# Patient Record
Sex: FEMALE | Race: WHITE | NOT HISPANIC OR LATINO | Employment: FULL TIME | URBAN - METROPOLITAN AREA
[De-identification: names, ages, dates, MRNs, and addresses within clinical notes are randomized per-mention and may not be internally consistent; named-entity substitution may affect disease eponyms.]

---

## 2019-01-15 DIAGNOSIS — Z01.812 PRE-OPERATIVE LABORATORY EXAMINATION: ICD-10-CM

## 2019-01-15 PROCEDURE — 36415 COLL VENOUS BLD VENIPUNCTURE: CPT

## 2019-01-15 PROCEDURE — 84703 CHORIONIC GONADOTROPIN ASSAY: CPT

## 2019-01-15 RX ORDER — OMEGA-3 FATTY ACIDS/FISH OIL 300-1000MG
CAPSULE ORAL PRN
Status: ON HOLD | COMMUNITY
End: 2020-01-24

## 2019-01-15 RX ORDER — COVID-19 ANTIGEN TEST
KIT MISCELLANEOUS PRN
Status: ON HOLD | COMMUNITY
End: 2020-01-24

## 2019-01-16 LAB — HCG SERPL QL: NEGATIVE

## 2019-01-17 ENCOUNTER — HOSPITAL ENCOUNTER (OUTPATIENT)
Facility: MEDICAL CENTER | Age: 29
End: 2019-01-17
Attending: ORTHOPAEDIC SURGERY | Admitting: ORTHOPAEDIC SURGERY
Payer: OTHER MISCELLANEOUS

## 2019-01-17 VITALS
OXYGEN SATURATION: 94 % | SYSTOLIC BLOOD PRESSURE: 110 MMHG | TEMPERATURE: 97.2 F | BODY MASS INDEX: 24.39 KG/M2 | HEIGHT: 65 IN | DIASTOLIC BLOOD PRESSURE: 66 MMHG | WEIGHT: 146.39 LBS | HEART RATE: 90 BPM | RESPIRATION RATE: 16 BRPM

## 2019-01-17 PROCEDURE — 500028 HCHG ARTHROWAND TURBOVAC 3.5/90 SUCT.: Performed by: ORTHOPAEDIC SURGERY

## 2019-01-17 PROCEDURE — 500786 HCHG KNIFE, ACL GRAFT 10MM: Performed by: ORTHOPAEDIC SURGERY

## 2019-01-17 PROCEDURE — 500423 HCHG DRESSING, ABD COMBINE: Performed by: ORTHOPAEDIC SURGERY

## 2019-01-17 PROCEDURE — 160048 HCHG OR STATISTICAL LEVEL 1-5: Performed by: ORTHOPAEDIC SURGERY

## 2019-01-17 PROCEDURE — A9270 NON-COVERED ITEM OR SERVICE: HCPCS

## 2019-01-17 PROCEDURE — 500257: Performed by: ORTHOPAEDIC SURGERY

## 2019-01-17 PROCEDURE — 160025 RECOVERY II MINUTES (STATS): Performed by: ORTHOPAEDIC SURGERY

## 2019-01-17 PROCEDURE — 160029 HCHG SURGERY MINUTES - 1ST 30 MINS LEVEL 4: Performed by: ORTHOPAEDIC SURGERY

## 2019-01-17 PROCEDURE — 160036 HCHG PACU - EA ADDL 30 MINS PHASE I: Performed by: ORTHOPAEDIC SURGERY

## 2019-01-17 PROCEDURE — 160041 HCHG SURGERY MINUTES - EA ADDL 1 MIN LEVEL 4: Performed by: ORTHOPAEDIC SURGERY

## 2019-01-17 PROCEDURE — 501838 HCHG SUTURE GENERAL: Performed by: ORTHOPAEDIC SURGERY

## 2019-01-17 PROCEDURE — C1713 ANCHOR/SCREW BN/BN,TIS/BN: HCPCS | Performed by: ORTHOPAEDIC SURGERY

## 2019-01-17 PROCEDURE — 700111 HCHG RX REV CODE 636 W/ 250 OVERRIDE (IP)

## 2019-01-17 PROCEDURE — 700101 HCHG RX REV CODE 250

## 2019-01-17 PROCEDURE — A6222 GAUZE <=16 IN NO W/SAL W/O B: HCPCS | Performed by: ORTHOPAEDIC SURGERY

## 2019-01-17 PROCEDURE — 700102 HCHG RX REV CODE 250 W/ 637 OVERRIDE(OP)

## 2019-01-17 PROCEDURE — 502580 HCHG PACK, KNEE ARTHROSCOPY: Performed by: ORTHOPAEDIC SURGERY

## 2019-01-17 PROCEDURE — 160022 HCHG BLOCK: Performed by: ORTHOPAEDIC SURGERY

## 2019-01-17 PROCEDURE — 160047 HCHG PACU  - EA ADDL 30 MINS PHASE II: Performed by: ORTHOPAEDIC SURGERY

## 2019-01-17 PROCEDURE — 160035 HCHG PACU - 1ST 60 MINS PHASE I: Performed by: ORTHOPAEDIC SURGERY

## 2019-01-17 PROCEDURE — 160009 HCHG ANES TIME/MIN: Performed by: ORTHOPAEDIC SURGERY

## 2019-01-17 PROCEDURE — 160002 HCHG RECOVERY MINUTES (STAT): Performed by: ORTHOPAEDIC SURGERY

## 2019-01-17 PROCEDURE — 160046 HCHG PACU - 1ST 60 MINS PHASE II: Performed by: ORTHOPAEDIC SURGERY

## 2019-01-17 PROCEDURE — 700111 HCHG RX REV CODE 636 W/ 250 OVERRIDE (IP): Performed by: ANESTHESIOLOGY

## 2019-01-17 DEVICE — DEVICE ULTRABUTTON ADJUSTABLE FIXATION: Type: IMPLANTABLE DEVICE | Status: FUNCTIONAL

## 2019-01-17 DEVICE — SCREW BIOSURE 9X25MM: Type: IMPLANTABLE DEVICE | Status: FUNCTIONAL

## 2019-01-17 RX ORDER — CEFAZOLIN SODIUM 1 G/3ML
INJECTION, POWDER, FOR SOLUTION INTRAMUSCULAR; INTRAVENOUS
Status: DISCONTINUED | OUTPATIENT
Start: 2019-01-17 | End: 2019-01-17 | Stop reason: HOSPADM

## 2019-01-17 RX ORDER — ACETAMINOPHEN 500 MG
TABLET ORAL
Status: COMPLETED
Start: 2019-01-17 | End: 2019-01-17

## 2019-01-17 RX ORDER — ONDANSETRON 2 MG/ML
4 INJECTION INTRAMUSCULAR; INTRAVENOUS
Status: COMPLETED | OUTPATIENT
Start: 2019-01-17 | End: 2019-01-17

## 2019-01-17 RX ORDER — DIPHENHYDRAMINE HYDROCHLORIDE 50 MG/ML
12.5 INJECTION INTRAMUSCULAR; INTRAVENOUS
Status: DISCONTINUED | OUTPATIENT
Start: 2019-01-17 | End: 2019-01-17 | Stop reason: HOSPADM

## 2019-01-17 RX ORDER — MEPERIDINE HYDROCHLORIDE 25 MG/ML
6.25 INJECTION INTRAMUSCULAR; INTRAVENOUS; SUBCUTANEOUS
Status: DISCONTINUED | OUTPATIENT
Start: 2019-01-17 | End: 2019-01-17 | Stop reason: HOSPADM

## 2019-01-17 RX ORDER — CELECOXIB 200 MG/1
CAPSULE ORAL
Status: COMPLETED
Start: 2019-01-17 | End: 2019-01-17

## 2019-01-17 RX ORDER — SODIUM CHLORIDE, SODIUM LACTATE, POTASSIUM CHLORIDE, CALCIUM CHLORIDE 600; 310; 30; 20 MG/100ML; MG/100ML; MG/100ML; MG/100ML
INJECTION, SOLUTION INTRAVENOUS ONCE
Status: COMPLETED | OUTPATIENT
Start: 2019-01-17 | End: 2019-01-17

## 2019-01-17 RX ORDER — GABAPENTIN 300 MG/1
CAPSULE ORAL
Status: COMPLETED
Start: 2019-01-17 | End: 2019-01-17

## 2019-01-17 RX ORDER — BUPIVACAINE HYDROCHLORIDE 5 MG/ML
INJECTION, SOLUTION PERINEURAL
Status: DISCONTINUED | OUTPATIENT
Start: 2019-01-17 | End: 2019-01-17 | Stop reason: HOSPADM

## 2019-01-17 RX ORDER — GABAPENTIN 300 MG/1
300 CAPSULE ORAL ONCE
Status: COMPLETED | OUTPATIENT
Start: 2019-01-17 | End: 2019-01-17

## 2019-01-17 RX ORDER — ACETAMINOPHEN 500 MG
1000 TABLET ORAL ONCE
Status: COMPLETED | OUTPATIENT
Start: 2019-01-17 | End: 2019-01-17

## 2019-01-17 RX ORDER — HYDROMORPHONE HYDROCHLORIDE 2 MG/ML
0.1 INJECTION, SOLUTION INTRAMUSCULAR; INTRAVENOUS; SUBCUTANEOUS
Status: DISCONTINUED | OUTPATIENT
Start: 2019-01-17 | End: 2019-01-17 | Stop reason: HOSPADM

## 2019-01-17 RX ORDER — HYDROMORPHONE HYDROCHLORIDE 2 MG/ML
0.4 INJECTION, SOLUTION INTRAMUSCULAR; INTRAVENOUS; SUBCUTANEOUS
Status: DISCONTINUED | OUTPATIENT
Start: 2019-01-17 | End: 2019-01-17 | Stop reason: HOSPADM

## 2019-01-17 RX ORDER — CELECOXIB 200 MG/1
200 CAPSULE ORAL ONCE
Status: COMPLETED | OUTPATIENT
Start: 2019-01-17 | End: 2019-01-17

## 2019-01-17 RX ORDER — HALOPERIDOL 5 MG/ML
1 INJECTION INTRAMUSCULAR
Status: DISCONTINUED | OUTPATIENT
Start: 2019-01-17 | End: 2019-01-17 | Stop reason: HOSPADM

## 2019-01-17 RX ORDER — HYDROMORPHONE HYDROCHLORIDE 2 MG/ML
0.2 INJECTION, SOLUTION INTRAMUSCULAR; INTRAVENOUS; SUBCUTANEOUS
Status: DISCONTINUED | OUTPATIENT
Start: 2019-01-17 | End: 2019-01-17 | Stop reason: HOSPADM

## 2019-01-17 RX ADMIN — CELECOXIB 200 MG: 200 CAPSULE ORAL at 13:10

## 2019-01-17 RX ADMIN — SODIUM CHLORIDE, SODIUM LACTATE, POTASSIUM CHLORIDE, CALCIUM CHLORIDE: 600; 310; 30; 20 INJECTION, SOLUTION INTRAVENOUS at 13:06

## 2019-01-17 RX ADMIN — ACETAMINOPHEN 1000 MG: 500 TABLET, COATED ORAL at 13:10

## 2019-01-17 RX ADMIN — GABAPENTIN 300 MG: 300 CAPSULE ORAL at 13:10

## 2019-01-17 RX ADMIN — Medication 1000 MG: at 13:10

## 2019-01-17 RX ADMIN — ONDANSETRON 4 MG: 2 INJECTION INTRAMUSCULAR; INTRAVENOUS at 17:57

## 2019-01-17 ASSESSMENT — PAIN SCALES - GENERAL
PAINLEVEL_OUTOF10: 0

## 2019-01-17 NOTE — OR NURSING
1235: Brought patient back to pre-op and assumed care.  1315: Patient allergies and NPO status verified, home medication reconciliation completed and belongings secured. Patient verbalizes understanding of pain scale, expected course of stay and plan of care. Surgical site verified with patient. IV access established. Sequentials placed on legs.

## 2019-01-18 NOTE — DISCHARGE INSTRUCTIONS
ACTIVITY: Rest and take it easy for the first 24 hours.  A responsible adult is recommended to remain with you during that time.  It is normal to feel sleepy.  We encourage you to not do anything that requires balance, judgment or coordination.    MILD FLU-LIKE SYMPTOMS ARE NORMAL. YOU MAY EXPERIENCE GENERALIZED MUSCLE ACHES, THROAT IRRITATION, HEADACHE AND/OR SOME NAUSEA.    FOR 24 HOURS DO NOT:  Drive, operate machinery or run household appliances.  Drink beer or alcoholic beverages.   Make important decisions or sign legal documents.    SPECIAL INSTRUCTIONS: *TOE-TOUCH WEIGHT BEARING LEFT LEG AFTER BLOCK WEARS OFF . NO WEIGHT BEARING LEFT LEG UNTIL BLOCK WEARS OFF.*USE CRUTCHES.  PLEASE READ AND FOLLOW [facility] ORTHOPEDICS DISCHARGE INSTRUCTION SHEET.   DIET: To avoid nausea, slowly advance diet as tolerated, avoiding spicy or greasy foods for the first day.  Add more substantial food to your diet according to your physician's instructions.  Babies can be fed formula or breast milk as soon as they are hungry.  INCREASE FLUIDS AND FIBER TO AVOID CONSTIPATION.         *FOLLOW UP AS SCHEDULED WITH DR. BEARD.   You should CALL YOUR PHYSICIAN if you develop:  Fever greater than 101 degrees F.  Pain not relieved by medication, or persistent nausea or vomiting.  Excessive bleeding (blood soaking through dressing) or unexpected drainage from the wound.  Extreme redness or swelling around the incision site, drainage of pus or foul smelling drainage.  Inability to urinate or empty your bladder within 8 hours.  Problems with breathing or chest pain.    You should call 911 if you develop problems with breathing or chest pain.  If you are unable to contact your doctor or surgical center, you should go to the nearest emergency room or urgent care center.  Physician's telephone #: *[phone]**    If any questions arise, call your doctor.  If your doctor is not available, please feel free to call the Surgical Center at  [phone].  The Center is open Monday through Friday from 7AM to 7PM.  You can also call the HEALTH HOTLINE open 24 hours/day, 7 days/week and speak to a nurse at [phone], or toll free at [phone].    A registered nurse may call you a few days after your surgery to see how you are doing after your procedure.    MEDICATIONS: Resume taking daily medication.  Take prescribed pain medication with food.  If no medication is prescribed, you may take non-aspirin pain medication if needed.  PAIN MEDICATION CAN BE VERY CONSTIPATING.  Take a stool softener or laxative such as senokot, pericolace, or milk of magnesia if needed.    Prescription given for **PATIENT HAS*.  Last pain medication given at *none in PACU**. Zofran 4mg given at 5:57pm for nausea    If your physician has prescribed pain medication that includes Acetaminophen (Tylenol), do not take additional Acetaminophen (Tylenol) while taking the prescribed medication.    Depression / Suicide Risk    As you are discharged from this [facility] facility, it is important to learn how to keep safe from harming yourself.    Recognize the warning signs:  · Abrupt changes in personality, positive or negative- including increase in energy   · Giving away possessions  · Change in eating patterns- significant weight changes-  positive or negative  · Change in sleeping patterns- unable to sleep or sleeping all the time   · Unwillingness or inability to communicate  · Depression  · Unusual sadness, discouragement and loneliness  · Talk of wanting to die  · Neglect of personal appearance   · Rebelliousness- reckless behavior  · Withdrawal from people/activities they love  · Confusion- inability to concentrate     If you or a loved one observes any of these behaviors or has concerns about self-harm, here's what you can do:  · Talk about it- your feelings and reasons for harming yourself  · Remove any means that you might use to hurt yourself (examples: pills,  rope, extension cords, firearm)  · Get professional help from the community (Mental Health, Substance Abuse, psychological counseling)  · Do not be alone:Call your Safe Contact- someone whom you trust who will be there for you.  · Call your local CRISIS HOTLINE 868-2089 or 873-082-5501  · Call your local Children's Mobile Crisis Response Team [place] (540) 766-0260 or www.Sellfy  · Call the toll free National Suicide Prevention Hotlines   · National Suicide Prevention Lifeline 125-421-EVED (0485)  [place] [facility] 800-SUICIDE (636-0784)    Peripheral Nerve Block Discharge Instructions from Same Day Surgery and Inpatient :    What to Expect - Lower Extremity  · The block may cause you to experience numbness and weakness in your {LEG LOCATION [id number]} on the same side as your surgery  · Numbness, tingling and / or weakness are all normal. For some people, this may be an unpleasant sensation  · These issues will be resolved when the local anesthetic wears off   · You may experience numbness and tingling in your thigh on the same side as your surgery if the block medicine was injected at your groin area  · Numbness will make it difficult to walk  · You may have problems with balance and walking so be very careful   · Follow your surgeon's direction regarding weight bearing on your surgical limb  · Be very careful with your numb limb  Precautions  · The numbness may affect your balance  · Be careful when walking or moving around  · Your leg may be weak: be very careful putting weight on it  · If your surgeon did not specify a time, you should not bear weight for 24 hours  · Be sure to ask for help when you need it  · It is better to have help than to fall and hurt yourself  Prevent Injury  · Protect the limb like a baby  · Beware of exposing your limb to extreme heat or cold or trauma  · The limb may be injured without you noticing because it is numb  · Keep the limb elevated whenever  "possible  · Do not sleep on the limb  · Change the position of the limb regularly  · Avoid putting pressure on your surgical limb  Pain Control  · The initial block on the day of surgery will make your extremity feel \"numb\"  · Any consecutive injection including prior to discharge from the hospital will make your extremity feel \"numb\"  · You may feel an aching or burning when the local anesthesia starts to wear off  · Take pain pills as prescribed by your surgeon  · Call your surgeon or anesthesiologist if you do not have adequate pain control  ·   "

## 2019-01-18 NOTE — OR NURSING
Became nauseated when getting ready to go to PACU 2.  Zofran given. VS remain stable, no emesis, report to PACU 2.  No change in assessment.

## 2019-01-18 NOTE — OR NURSING
Respirations easy in PACU. Dressing clean and dry.  Brace in place.  Left leg elevated on pillows with ice to left knee. Palpable pedal pulses, feet warm and pink with brisk capillary refill, toes mobile. Patient denies pain and nausea.

## 2019-01-18 NOTE — OR NURSING
"1815 patient to stage 2  Patient settled in recliner chair post short ambulation from erika - pt dressed with assist by RN. Dressing CDI to L knee with polar ice pad under ace wrap and brace to LLE in place. Pt denies pain but reports intermittent nausea. Reports hx of frequent nausea as baseline.   1901 Reviewed discharge instructions with patient/family; family states verbal understanding. Pt reports feeling sleepy and nausea improved and agrees to go home when IV LR bag complete. Pt reports crutches at home and previous experience with ambulation.  1925 Pt reports \"ready to go home\". \"Can I have chik-keya-a?\" D/Josue to care of family post uneventful stay in PACU 2.    "

## 2019-01-18 NOTE — OR SURGEON
Immediate Post OP Note    PreOp Diagnosis: left knee ACL tear    PostOp Diagnosis: left knee acl tear and lateral meniscus tear    Procedure(s):  ACL RECONSTRUCTION SCOPE - W/BONE TENDON BONE AUTOGRAFT - Wound Class: Clean  MENISCECTOMY - PARTIAL LATERAL - Wound Class: Clean    Surgeon(s):  Antwon Garrido M.D.    Anesthesiologist/Type of Anesthesia:  Anesthesiologist: Kaz Stewart M.D./General    Surgical Staff:  Assistant: Iram Stanley CFA  Circulator: Duke Bowles R.N.  Scrub Person: Annie Barrett    Specimens removed if any:  * No specimens in log *    Estimated Blood Loss: minimal     Findings: acl tear , lateral meniscus tear     Complications: no apparent         1/17/2019 4:32 PM Antwon Garrido M.D.

## 2019-01-18 NOTE — OP REPORT
DATE OF SERVICE:  01/17/2019    PREOPERATIVE DIAGNOSIS:  Left knee anterior cruciate ligament tear.    POSTOPERATIVE DIAGNOSES:  Left knee anterior cruciate ligament tear and   lateral meniscus tear.    PROCEDURES:  1.  Left knee arthroscopy with ACL reconstruction using BTB autograft.  2.  Left knee partial lateral meniscectomy.    SURGEON:  Antwon Garrido MD    ASSISTANT:  Iram Stanley, certified first assist.    ANESTHESIA:  General plus local and block.    ANESTHESIOLOGIST:  Kaz Stewart MD    TOURNIQUET USE:  None.    BLOOD LOSS:  Minimal.    COMPLICATIONS:  No apparent.    DISPOSITION:  PACU.    CONDITION:  Stable.    INDICATIONS:  The patient is a 28-year-old active female who injured her left   knee skiing.  She had an ACL tear confirmed on MRI.  Exam was consistent with   this.  Discussed with her the treatment options of ACL with conservative or   reconstruction.  She elected to proceed with reconstruction using BTB   autograft.  We discussed risks, benefits, and rationale, risk including but   not limited to infection, neurovascular injury, incomplete relief of symptoms,   inability to return to pre-injury state, DVT, PE, cardiac arrest, possible   need for further surgery, and complications of anesthesia.  Informed consent   was signed and placed on the chart.  All of her questions are answered.  No   guarantees implied or given.    TECHNIQUE:  Patient and I both agreed the correct operative extremity in   preoperative holding.  Left leg was signed and marked in preoperative holding.    She was given 1 gram IV Ancef prophylaxis.  I consult Dr. Kaz Stewart of   anesthesias regarding perioperative pain management.  He consented the patient   for an adductor canal block, which was carried out under sterile technique   without complication.  Patient was taken to the operative suite.  After   adequate anesthesia, a time-out was taken by all in the room.  Examination   under anesthesia of the  right uninvolved leg showed a stable Lachman's,   negative pivot shift, and stable ligaments.  Examination of the left leg   showed full range of motion, stable to varus and valgus stress at 0 and 30   degrees, stable posterior drawer with a positive Lachman's, and positive pivot   shift.  Tourniquet was placed, but not inflated.  The left leg was sterilely   prepped and draped in standard fashion.    Standard arthroscopic lateral portal was established with an inflow outflow   superomedial portal.  Findings were that of a lateral meniscus tear, radial   component at the mid body with an unstable flap and ACL tear.  Scope was   removed.  A midline incision was made.  A central third patellar   bone-tendon-bone graft was harvested.    Iram Stanley, certified first assist, prepped the graft at the back table   to a size 10.  I then completed a diagnostic arthroscopy finding the cartilage   was intact throughout the rest of the knee.  Arthroscopic biters were used   for a partial lateral meniscectomy to take the radial tear back to a smooth   contoured edge.  The rest of the lateral meniscus was stable.  Remnant of the   ACL was debrided, noting the anatomic insertion on the tibia and the femur.    Guide pin was placed in the anatomic footprint of the tibia and this was   overdrilled with a 6 and then 10 mm cannulated reamer.  Bone graft was   harvested for later use.  Using a 7 mm Smith and Nephew over-the-top guide,   the knee was hyperflexed and this was used to place a guide pin in the   footprint of the femoral attachment of the ACL.  This was then drilled over   with an Endobutton 4.5 mm drill through the far cortex, followed by a 10 mm   acorn reamer to a depth of 24 mm for a roughly 21 or 22 mm bone plug.  Bone   was harvested and saved.  The bone graft was saved for later use.  A suture   was placed on the guide pin.  This was pulled through the tunnels and graft   was passed without difficulty.  Endobutton  was flipped on the far cortex.    Ultra button was used and excess tension was taken out of the graft.  This   resulted in good tunnel and a graft tunnel match with very small excess tibial   bone hanging out of the tibial tunnel.  The knee was cycled.  Graft was felt   to be isometric.  It was then tensioned and a 9x25 BioComposite screw was   placed in the tibial tunnel.  Reevaluation showed the graft in good position.    Full extension was achievable.  Good tension on the graft.  Stable Lachman   testing.  Reevaluation showed no loose debris in the knee.  The central third   of the patellar tendon was closed with interrupted figure-of-eight #3 Vicryl.    Bone graft was placed in the patellar defect.  This was closed over with 2-0   Vicryl.  Remaining bone graft was placed into the tibial defect, which was   closed over with 2-0 Vicryl.  Subcutaneous tissues closed with 2-0 Vicryl   after irrigation, followed by nylon for the skin and portals.  Xeroform was   placed over the incisions and 0.5% Marcaine plain was injected into the   incision and the joint.  Soft sterile dressing was applied.  Patient   transferred to recovery in stable condition.  Counts correct.  No apparent   complications.    Iram Stanley, certified first assist, was essential for successful   completion of the case.  It could not have been done without her.  In   recovery, patient was able to dorsiflex, plantar flex, intact to light sensory   touch.  Toes are pink, warm with brisk cap refill.       ____________________________________     MD BEBO Faulkner / DAVID    DD:  01/17/2019 16:40:04  DT:  01/17/2019 17:22:48    D#:  [id number]  Job#:  [id number]

## 2019-06-26 LAB
ABO GROUP BLD: NORMAL
BLD GP AB SCN SERPL QL: NEGATIVE
GLUCOSE 1H P CHAL SERPL-MCNC: 99 MG/DL
HBV SURFACE AG SERPL QL IA: NORMAL
RH BLD: POSITIVE
RUBV IGG SERPL IA-ACNC: NORMAL

## 2019-08-02 ENCOUNTER — HOSPITAL ENCOUNTER (OUTPATIENT)
Dept: LAB | Facility: MEDICAL CENTER | Age: 29
End: 2019-08-02
Attending: OBSTETRICS & GYNECOLOGY
Payer: COMMERCIAL

## 2019-08-02 PROCEDURE — 81420 FETAL CHRMOML ANEUPLOIDY: CPT

## 2019-08-02 PROCEDURE — 81422 FETAL CHRMOML MICRODELTJ: CPT

## 2019-08-14 LAB
22Q112 DEL SYND Q0669: NORMAL
5P15.2 DEL BLD/T FISH: NORMAL
ANNOTATION COMMENT IMP: NORMAL
AS GENE MUT ANL BLD/T: NORMAL
DEL 1P36 SYND Q0208: NORMAL
EER FATAL ANEU W MICROD Q0212: NORMAL
FET CHR X + Y ANEUP RISK PLAS.CFDNA QN: NORMAL
FET SEX US: NORMAL
FET TS 13 RISK PLAS.CFDNA QL: NORMAL
FET TS 18 RISK PLAS.CFDNA QL: NORMAL
FET TS 21 RISK PLAS.CFDNA QL: NORMAL
FETAL FRACTION Q0204: 7.8
GA (DAYS): 3 D
GA (WEEKS): 15 WK
PWS GENE MUT ANL BLD/T: NORMAL
SERVICE CMNT-IMP: YES
TRIPLOIDY VAN TWIN Q0202: NORMAL

## 2019-10-28 ENCOUNTER — HOSPITAL ENCOUNTER (OUTPATIENT)
Dept: LAB | Facility: MEDICAL CENTER | Age: 29
End: 2019-10-28
Attending: OBSTETRICS & GYNECOLOGY
Payer: COMMERCIAL

## 2019-10-28 LAB
ERYTHROCYTE [DISTWIDTH] IN BLOOD BY AUTOMATED COUNT: 47.2 FL (ref 35.9–50)
GLUCOSE 1H P 50 G GLC PO SERPL-MCNC: 99 MG/DL (ref 70–139)
HCT VFR BLD AUTO: 43.4 % (ref 37–47)
HGB BLD-MCNC: 13.7 G/DL (ref 12–16)
MCH RBC QN AUTO: 30.4 PG (ref 27–33)
MCHC RBC AUTO-ENTMCNC: 31.6 G/DL (ref 33.6–35)
MCV RBC AUTO: 96.4 FL (ref 81.4–97.8)
PLATELET # BLD AUTO: 127 K/UL (ref 164–446)
PMV BLD AUTO: 11.3 FL (ref 9–12.9)
RBC # BLD AUTO: 4.5 M/UL (ref 4.2–5.4)
TREPONEMA PALLIDUM IGG+IGM AB [PRESENCE] IN SERUM OR PLASMA BY IMMUNOASSAY: NON REACTIVE
WBC # BLD AUTO: 9.7 K/UL (ref 4.8–10.8)

## 2019-10-28 PROCEDURE — 86780 TREPONEMA PALLIDUM: CPT

## 2019-10-28 PROCEDURE — 85027 COMPLETE CBC AUTOMATED: CPT

## 2019-10-28 PROCEDURE — 36415 COLL VENOUS BLD VENIPUNCTURE: CPT

## 2019-10-28 PROCEDURE — 82950 GLUCOSE TEST: CPT

## 2019-11-22 ENCOUNTER — HOSPITAL ENCOUNTER (OUTPATIENT)
Dept: LAB | Facility: MEDICAL CENTER | Age: 29
End: 2019-11-22
Attending: OBSTETRICS & GYNECOLOGY
Payer: COMMERCIAL

## 2019-11-22 LAB
ERYTHROCYTE [DISTWIDTH] IN BLOOD BY AUTOMATED COUNT: 45.7 FL (ref 35.9–50)
HCT VFR BLD AUTO: 40.2 % (ref 37–47)
HGB BLD-MCNC: 12.8 G/DL (ref 12–16)
MCH RBC QN AUTO: 29.9 PG (ref 27–33)
MCHC RBC AUTO-ENTMCNC: 31.8 G/DL (ref 33.6–35)
MCV RBC AUTO: 93.9 FL (ref 81.4–97.8)
PLATELET # BLD AUTO: 176 K/UL (ref 164–446)
PMV BLD AUTO: 10.9 FL (ref 9–12.9)
RBC # BLD AUTO: 4.28 M/UL (ref 4.2–5.4)
WBC # BLD AUTO: 12.1 K/UL (ref 4.8–10.8)

## 2019-11-22 PROCEDURE — 36415 COLL VENOUS BLD VENIPUNCTURE: CPT

## 2019-11-22 PROCEDURE — 85027 COMPLETE CBC AUTOMATED: CPT

## 2019-12-30 ENCOUNTER — HOSPITAL ENCOUNTER (OUTPATIENT)
Dept: LAB | Facility: MEDICAL CENTER | Age: 29
End: 2019-12-30
Attending: OBSTETRICS & GYNECOLOGY
Payer: COMMERCIAL

## 2019-12-30 PROCEDURE — 87081 CULTURE SCREEN ONLY: CPT

## 2019-12-30 PROCEDURE — 87150 DNA/RNA AMPLIFIED PROBE: CPT

## 2019-12-31 LAB
AMBIGUOUS DTTM AMBI4: NORMAL
SIGNIFICANT IND 70042: NORMAL
SITE SITE: NORMAL
SOURCE SOURCE: NORMAL

## 2020-01-01 LAB — GP B STREP DNA SPEC QL NAA+PROBE: POSITIVE

## 2020-01-22 ENCOUNTER — HOSPITAL ENCOUNTER (OUTPATIENT)
Dept: LAB | Facility: MEDICAL CENTER | Age: 30
End: 2020-01-22
Attending: OBSTETRICS & GYNECOLOGY
Payer: COMMERCIAL

## 2020-01-22 LAB
ERYTHROCYTE [DISTWIDTH] IN BLOOD BY AUTOMATED COUNT: 45.9 FL (ref 35.9–50)
HCT VFR BLD AUTO: 44.3 % (ref 37–47)
HGB BLD-MCNC: 14.1 G/DL (ref 12–16)
MCH RBC QN AUTO: 29.2 PG (ref 27–33)
MCHC RBC AUTO-ENTMCNC: 31.8 G/DL (ref 33.6–35)
MCV RBC AUTO: 91.7 FL (ref 81.4–97.8)
PLATELET # BLD AUTO: 228 K/UL (ref 164–446)
PMV BLD AUTO: 11 FL (ref 9–12.9)
RBC # BLD AUTO: 4.83 M/UL (ref 4.2–5.4)
WBC # BLD AUTO: 12 K/UL (ref 4.8–10.8)

## 2020-01-22 PROCEDURE — 85027 COMPLETE CBC AUTOMATED: CPT

## 2020-01-24 ENCOUNTER — ANESTHESIA EVENT (OUTPATIENT)
Dept: OBGYN | Facility: MEDICAL CENTER | Age: 30
End: 2020-01-24
Payer: COMMERCIAL

## 2020-01-24 ENCOUNTER — ANESTHESIA (OUTPATIENT)
Dept: OBGYN | Facility: MEDICAL CENTER | Age: 30
End: 2020-01-24
Payer: COMMERCIAL

## 2020-01-24 ENCOUNTER — HOSPITAL ENCOUNTER (INPATIENT)
Facility: MEDICAL CENTER | Age: 30
LOS: 3 days | End: 2020-01-27
Attending: OBSTETRICS & GYNECOLOGY | Admitting: OBSTETRICS & GYNECOLOGY
Payer: COMMERCIAL

## 2020-01-24 DIAGNOSIS — G89.18 POSTOPERATIVE PAIN: ICD-10-CM

## 2020-01-24 LAB
BASOPHILS # BLD AUTO: 0.5 % (ref 0–1.8)
BASOPHILS # BLD: 0.08 K/UL (ref 0–0.12)
CRYSTALS AMN MICRO: NORMAL
EOSINOPHIL # BLD AUTO: 0.24 K/UL (ref 0–0.51)
EOSINOPHIL NFR BLD: 1.5 % (ref 0–6.9)
ERYTHROCYTE [DISTWIDTH] IN BLOOD BY AUTOMATED COUNT: 43.8 FL (ref 35.9–50)
HCT VFR BLD AUTO: 41.3 % (ref 37–47)
HGB BLD-MCNC: 14.2 G/DL (ref 12–16)
HOLDING TUBE BB 8507: NORMAL
IMM GRANULOCYTES # BLD AUTO: 0.19 K/UL (ref 0–0.11)
IMM GRANULOCYTES NFR BLD AUTO: 1.2 % (ref 0–0.9)
LYMPHOCYTES # BLD AUTO: 4.35 K/UL (ref 1–4.8)
LYMPHOCYTES NFR BLD: 26.8 % (ref 22–41)
MCH RBC QN AUTO: 30.3 PG (ref 27–33)
MCHC RBC AUTO-ENTMCNC: 34.4 G/DL (ref 33.6–35)
MCV RBC AUTO: 88.2 FL (ref 81.4–97.8)
MONOCYTES # BLD AUTO: 1.51 K/UL (ref 0–0.85)
MONOCYTES NFR BLD AUTO: 9.3 % (ref 0–13.4)
NEUTROPHILS # BLD AUTO: 9.88 K/UL (ref 2–7.15)
NEUTROPHILS NFR BLD: 60.7 % (ref 44–72)
NRBC # BLD AUTO: 0 K/UL
NRBC BLD-RTO: 0 /100 WBC
PLATELET # BLD AUTO: 235 K/UL (ref 164–446)
PMV BLD AUTO: 10.9 FL (ref 9–12.9)
RBC # BLD AUTO: 4.68 M/UL (ref 4.2–5.4)
WBC # BLD AUTO: 16.3 K/UL (ref 4.8–10.8)

## 2020-01-24 PROCEDURE — 700105 HCHG RX REV CODE 258: Performed by: ANESTHESIOLOGY

## 2020-01-24 PROCEDURE — 770002 HCHG ROOM/CARE - OB PRIVATE (112)

## 2020-01-24 PROCEDURE — 304964 HCHG RECOVERY ROOM TIME 1HR: Performed by: OBSTETRICS & GYNECOLOGY

## 2020-01-24 PROCEDURE — 89060 EXAM SYNOVIAL FLUID CRYSTALS: CPT

## 2020-01-24 PROCEDURE — 700102 HCHG RX REV CODE 250 W/ 637 OVERRIDE(OP): Performed by: ANESTHESIOLOGY

## 2020-01-24 PROCEDURE — A9270 NON-COVERED ITEM OR SERVICE: HCPCS | Performed by: OBSTETRICS & GYNECOLOGY

## 2020-01-24 PROCEDURE — 700111 HCHG RX REV CODE 636 W/ 250 OVERRIDE (IP): Performed by: ANESTHESIOLOGY

## 2020-01-24 PROCEDURE — A9270 NON-COVERED ITEM OR SERVICE: HCPCS | Performed by: ANESTHESIOLOGY

## 2020-01-24 PROCEDURE — 700101 HCHG RX REV CODE 250: Performed by: ANESTHESIOLOGY

## 2020-01-24 PROCEDURE — 700111 HCHG RX REV CODE 636 W/ 250 OVERRIDE (IP): Performed by: OBSTETRICS & GYNECOLOGY

## 2020-01-24 PROCEDURE — 36415 COLL VENOUS BLD VENIPUNCTURE: CPT

## 2020-01-24 PROCEDURE — 10H07YZ INSERTION OF OTHER DEVICE INTO PRODUCTS OF CONCEPTION, VIA NATURAL OR ARTIFICIAL OPENING: ICD-10-PCS | Performed by: OBSTETRICS & GYNECOLOGY

## 2020-01-24 PROCEDURE — 700102 HCHG RX REV CODE 250 W/ 637 OVERRIDE(OP): Performed by: OBSTETRICS & GYNECOLOGY

## 2020-01-24 PROCEDURE — 305385 HCHG SURGICAL SERVICES 1/4 HOUR: Performed by: OBSTETRICS & GYNECOLOGY

## 2020-01-24 PROCEDURE — 59514 CESAREAN DELIVERY ONLY: CPT

## 2020-01-24 PROCEDURE — 306828 HCHG ANES-TIME GENERAL: Performed by: OBSTETRICS & GYNECOLOGY

## 2020-01-24 PROCEDURE — 700105 HCHG RX REV CODE 258

## 2020-01-24 PROCEDURE — 700111 HCHG RX REV CODE 636 W/ 250 OVERRIDE (IP)

## 2020-01-24 PROCEDURE — 700105 HCHG RX REV CODE 258: Performed by: OBSTETRICS & GYNECOLOGY

## 2020-01-24 PROCEDURE — 303615 HCHG EPIDURAL/SPINAL ANESTHESIA FOR LABOR

## 2020-01-24 PROCEDURE — 85025 COMPLETE CBC W/AUTO DIFF WBC: CPT

## 2020-01-24 RX ORDER — IBUPROFEN 600 MG/1
600 TABLET ORAL EVERY 6 HOURS PRN
Status: DISCONTINUED | OUTPATIENT
Start: 2020-01-24 | End: 2020-01-26

## 2020-01-24 RX ORDER — BUPIVACAINE HYDROCHLORIDE 2.5 MG/ML
INJECTION, SOLUTION EPIDURAL; INFILTRATION; INTRACAUDAL
Status: ACTIVE
Start: 2020-01-24 | End: 2020-01-24

## 2020-01-24 RX ORDER — SODIUM CHLORIDE, SODIUM LACTATE, POTASSIUM CHLORIDE, CALCIUM CHLORIDE 600; 310; 30; 20 MG/100ML; MG/100ML; MG/100ML; MG/100ML
INJECTION, SOLUTION INTRAVENOUS
Status: ACTIVE
Start: 2020-01-24 | End: 2020-01-24

## 2020-01-24 RX ORDER — HYDROMORPHONE HYDROCHLORIDE 1 MG/ML
0.1 INJECTION, SOLUTION INTRAMUSCULAR; INTRAVENOUS; SUBCUTANEOUS
Status: DISCONTINUED | OUTPATIENT
Start: 2020-01-24 | End: 2020-01-24 | Stop reason: HOSPADM

## 2020-01-24 RX ORDER — KETOROLAC TROMETHAMINE 30 MG/ML
30 INJECTION, SOLUTION INTRAMUSCULAR; INTRAVENOUS EVERY 6 HOURS
Status: DISPENSED | OUTPATIENT
Start: 2020-01-25 | End: 2020-01-25

## 2020-01-24 RX ORDER — HYDROXYZINE 50 MG/1
50 TABLET, FILM COATED ORAL ONCE
Status: COMPLETED | OUTPATIENT
Start: 2020-01-24 | End: 2020-01-24

## 2020-01-24 RX ORDER — SODIUM CHLORIDE, SODIUM GLUCONATE, SODIUM ACETATE, POTASSIUM CHLORIDE AND MAGNESIUM CHLORIDE 526; 502; 368; 37; 30 MG/100ML; MG/100ML; MG/100ML; MG/100ML; MG/100ML
INJECTION, SOLUTION INTRAVENOUS
Status: DISCONTINUED | OUTPATIENT
Start: 2020-01-24 | End: 2020-01-24 | Stop reason: SURG

## 2020-01-24 RX ORDER — OXYCODONE HCL 5 MG/5 ML
10 SOLUTION, ORAL ORAL
Status: DISCONTINUED | OUTPATIENT
Start: 2020-01-24 | End: 2020-01-24 | Stop reason: HOSPADM

## 2020-01-24 RX ORDER — ONDANSETRON 2 MG/ML
4 INJECTION INTRAMUSCULAR; INTRAVENOUS EVERY 6 HOURS PRN
Status: DISCONTINUED | OUTPATIENT
Start: 2020-01-24 | End: 2020-01-25

## 2020-01-24 RX ORDER — METHYLERGONOVINE MALEATE 0.2 MG/ML
INJECTION INTRAVENOUS PRN
Status: DISCONTINUED | OUTPATIENT
Start: 2020-01-24 | End: 2020-01-24 | Stop reason: SURG

## 2020-01-24 RX ORDER — ROPIVACAINE HYDROCHLORIDE 2 MG/ML
INJECTION, SOLUTION EPIDURAL; INFILTRATION; PERINEURAL
Status: COMPLETED
Start: 2020-01-24 | End: 2020-01-24

## 2020-01-24 RX ORDER — IBUPROFEN 800 MG/1
800 TABLET ORAL EVERY 8 HOURS PRN
Status: DISCONTINUED | OUTPATIENT
Start: 2020-01-24 | End: 2020-01-24

## 2020-01-24 RX ORDER — DIPHENHYDRAMINE HYDROCHLORIDE 50 MG/ML
12.5 INJECTION INTRAMUSCULAR; INTRAVENOUS
Status: DISCONTINUED | OUTPATIENT
Start: 2020-01-24 | End: 2020-01-24 | Stop reason: HOSPADM

## 2020-01-24 RX ORDER — MORPHINE SULFATE 0.5 MG/ML
INJECTION, SOLUTION EPIDURAL; INTRATHECAL; INTRAVENOUS PRN
Status: DISCONTINUED | OUTPATIENT
Start: 2020-01-24 | End: 2020-01-24 | Stop reason: SURG

## 2020-01-24 RX ORDER — ONDANSETRON 2 MG/ML
INJECTION INTRAMUSCULAR; INTRAVENOUS PRN
Status: DISCONTINUED | OUTPATIENT
Start: 2020-01-24 | End: 2020-01-24 | Stop reason: SURG

## 2020-01-24 RX ORDER — OXYCODONE HYDROCHLORIDE 10 MG/1
10 TABLET ORAL EVERY 4 HOURS PRN
Status: DISCONTINUED | OUTPATIENT
Start: 2020-01-24 | End: 2020-01-27 | Stop reason: HOSPADM

## 2020-01-24 RX ORDER — ONDANSETRON 2 MG/ML
4 INJECTION INTRAMUSCULAR; INTRAVENOUS
Status: DISCONTINUED | OUTPATIENT
Start: 2020-01-24 | End: 2020-01-24 | Stop reason: HOSPADM

## 2020-01-24 RX ORDER — OXYCODONE HYDROCHLORIDE AND ACETAMINOPHEN 5; 325 MG/1; MG/1
1 TABLET ORAL EVERY 4 HOURS PRN
Status: DISCONTINUED | OUTPATIENT
Start: 2020-01-24 | End: 2020-01-24

## 2020-01-24 RX ORDER — HALOPERIDOL 5 MG/ML
1 INJECTION INTRAMUSCULAR
Status: DISCONTINUED | OUTPATIENT
Start: 2020-01-24 | End: 2020-01-24 | Stop reason: HOSPADM

## 2020-01-24 RX ORDER — ONDANSETRON 2 MG/ML
4 INJECTION INTRAMUSCULAR; INTRAVENOUS EVERY 6 HOURS PRN
Status: DISCONTINUED | OUTPATIENT
Start: 2020-01-24 | End: 2020-01-27 | Stop reason: HOSPADM

## 2020-01-24 RX ORDER — DOCUSATE SODIUM 100 MG/1
100 CAPSULE, LIQUID FILLED ORAL 2 TIMES DAILY PRN
Status: DISCONTINUED | OUTPATIENT
Start: 2020-01-24 | End: 2020-01-27 | Stop reason: HOSPADM

## 2020-01-24 RX ORDER — OXYCODONE HYDROCHLORIDE 5 MG/1
5 TABLET ORAL EVERY 4 HOURS PRN
Status: DISCONTINUED | OUTPATIENT
Start: 2020-01-24 | End: 2020-01-27 | Stop reason: HOSPADM

## 2020-01-24 RX ORDER — DIPHENHYDRAMINE HCL 25 MG
25 TABLET ORAL EVERY 6 HOURS PRN
Status: DISCONTINUED | OUTPATIENT
Start: 2020-01-24 | End: 2020-01-27 | Stop reason: HOSPADM

## 2020-01-24 RX ORDER — PENICILLIN G POTASSIUM 5000000 [IU]/1
INJECTION, POWDER, FOR SOLUTION INTRAMUSCULAR; INTRAVENOUS
Status: ACTIVE
Start: 2020-01-24 | End: 2020-01-24

## 2020-01-24 RX ORDER — METHYLERGONOVINE MALEATE 0.2 MG/ML
0.2 INJECTION INTRAVENOUS
Status: DISCONTINUED | OUTPATIENT
Start: 2020-01-24 | End: 2020-01-24 | Stop reason: HOSPADM

## 2020-01-24 RX ORDER — OXYTOCIN 10 [USP'U]/ML
INJECTION, SOLUTION INTRAMUSCULAR; INTRAVENOUS PRN
Status: DISCONTINUED | OUTPATIENT
Start: 2020-01-24 | End: 2020-01-24 | Stop reason: SURG

## 2020-01-24 RX ORDER — MORPHINE SULFATE 2 MG/ML
INJECTION, SOLUTION INTRAMUSCULAR; INTRAVENOUS
Status: DISCONTINUED | OUTPATIENT
Start: 2020-01-24 | End: 2020-01-24

## 2020-01-24 RX ORDER — MISOPROSTOL 200 UG/1
800 TABLET ORAL
Status: DISCONTINUED | OUTPATIENT
Start: 2020-01-24 | End: 2020-01-27 | Stop reason: HOSPADM

## 2020-01-24 RX ORDER — SODIUM CHLORIDE, SODIUM LACTATE, POTASSIUM CHLORIDE, CALCIUM CHLORIDE 600; 310; 30; 20 MG/100ML; MG/100ML; MG/100ML; MG/100ML
INJECTION, SOLUTION INTRAVENOUS
Status: COMPLETED
Start: 2020-01-24 | End: 2020-01-24

## 2020-01-24 RX ORDER — CARBOPROST TROMETHAMINE 250 UG/ML
250 INJECTION, SOLUTION INTRAMUSCULAR
Status: DISCONTINUED | OUTPATIENT
Start: 2020-01-24 | End: 2020-01-24 | Stop reason: HOSPADM

## 2020-01-24 RX ORDER — ONDANSETRON 2 MG/ML
4 INJECTION INTRAMUSCULAR; INTRAVENOUS EVERY 6 HOURS PRN
Status: DISCONTINUED | OUTPATIENT
Start: 2020-01-24 | End: 2020-01-24

## 2020-01-24 RX ORDER — HYDROMORPHONE HYDROCHLORIDE 1 MG/ML
0.4 INJECTION, SOLUTION INTRAMUSCULAR; INTRAVENOUS; SUBCUTANEOUS
Status: DISCONTINUED | OUTPATIENT
Start: 2020-01-24 | End: 2020-01-24 | Stop reason: HOSPADM

## 2020-01-24 RX ORDER — ONDANSETRON 4 MG/1
4 TABLET, ORALLY DISINTEGRATING ORAL EVERY 6 HOURS PRN
Status: DISCONTINUED | OUTPATIENT
Start: 2020-01-24 | End: 2020-01-27 | Stop reason: HOSPADM

## 2020-01-24 RX ORDER — DIPHENHYDRAMINE HYDROCHLORIDE 50 MG/ML
25 INJECTION INTRAMUSCULAR; INTRAVENOUS EVERY 6 HOURS PRN
Status: DISCONTINUED | OUTPATIENT
Start: 2020-01-24 | End: 2020-01-27 | Stop reason: HOSPADM

## 2020-01-24 RX ORDER — KETOROLAC TROMETHAMINE 30 MG/ML
INJECTION, SOLUTION INTRAMUSCULAR; INTRAVENOUS PRN
Status: DISCONTINUED | OUTPATIENT
Start: 2020-01-24 | End: 2020-01-24 | Stop reason: SURG

## 2020-01-24 RX ORDER — SODIUM CHLORIDE, SODIUM LACTATE, POTASSIUM CHLORIDE, CALCIUM CHLORIDE 600; 310; 30; 20 MG/100ML; MG/100ML; MG/100ML; MG/100ML
INJECTION, SOLUTION INTRAVENOUS PRN
Status: DISCONTINUED | OUTPATIENT
Start: 2020-01-24 | End: 2020-01-27 | Stop reason: HOSPADM

## 2020-01-24 RX ORDER — OXYCODONE HCL 5 MG/5 ML
5 SOLUTION, ORAL ORAL
Status: DISCONTINUED | OUTPATIENT
Start: 2020-01-24 | End: 2020-01-24 | Stop reason: HOSPADM

## 2020-01-24 RX ORDER — ACETAMINOPHEN 325 MG/1
650 TABLET ORAL EVERY 6 HOURS
Status: DISCONTINUED | OUTPATIENT
Start: 2020-01-25 | End: 2020-01-27 | Stop reason: HOSPADM

## 2020-01-24 RX ORDER — MISOPROSTOL 200 UG/1
800 TABLET ORAL
Status: DISCONTINUED | OUTPATIENT
Start: 2020-01-24 | End: 2020-01-24 | Stop reason: HOSPADM

## 2020-01-24 RX ORDER — METOCLOPRAMIDE HYDROCHLORIDE 5 MG/ML
10 INJECTION INTRAMUSCULAR; INTRAVENOUS ONCE
Status: COMPLETED | OUTPATIENT
Start: 2020-01-24 | End: 2020-01-24

## 2020-01-24 RX ORDER — VITAMIN A ACETATE, BETA CAROTENE, ASCORBIC ACID, CHOLECALCIFEROL, .ALPHA.-TOCOPHEROL ACETATE, DL-, THIAMINE MONONITRATE, RIBOFLAVIN, NIACINAMIDE, PYRIDOXINE HYDROCHLORIDE, FOLIC ACID, CYANOCOBALAMIN, CALCIUM CARBONATE, FERROUS FUMARATE, ZINC OXIDE, CUPRIC OXIDE 3080; 12; 120; 400; 1; 1.84; 3; 20; 22; 920; 25; 200; 27; 10; 2 [IU]/1; UG/1; MG/1; [IU]/1; MG/1; MG/1; MG/1; MG/1; MG/1; [IU]/1; MG/1; MG/1; MG/1; MG/1; MG/1
1 TABLET, FILM COATED ORAL EVERY MORNING
Status: DISCONTINUED | OUTPATIENT
Start: 2020-01-25 | End: 2020-01-27 | Stop reason: HOSPADM

## 2020-01-24 RX ORDER — BISACODYL 10 MG
10 SUPPOSITORY, RECTAL RECTAL PRN
Status: DISCONTINUED | OUTPATIENT
Start: 2020-01-24 | End: 2020-01-27 | Stop reason: HOSPADM

## 2020-01-24 RX ORDER — BUPIVACAINE HYDROCHLORIDE 2.5 MG/ML
INJECTION, SOLUTION EPIDURAL; INFILTRATION; INTRACAUDAL
Status: COMPLETED
Start: 2020-01-24 | End: 2020-01-24

## 2020-01-24 RX ORDER — BUPIVACAINE HYDROCHLORIDE 2.5 MG/ML
INJECTION, SOLUTION EPIDURAL; INFILTRATION; INTRACAUDAL
Status: ACTIVE
Start: 2020-01-24 | End: 2020-01-25

## 2020-01-24 RX ORDER — SODIUM CHLORIDE, SODIUM GLUCONATE, SODIUM ACETATE, POTASSIUM CHLORIDE AND MAGNESIUM CHLORIDE 526; 502; 368; 37; 30 MG/100ML; MG/100ML; MG/100ML; MG/100ML; MG/100ML
1500 INJECTION, SOLUTION INTRAVENOUS ONCE
Status: COMPLETED | OUTPATIENT
Start: 2020-01-24 | End: 2020-01-24

## 2020-01-24 RX ORDER — BUPIVACAINE HYDROCHLORIDE 7.5 MG/ML
INJECTION, SOLUTION INTRASPINAL
Status: COMPLETED | OUTPATIENT
Start: 2020-01-24 | End: 2020-01-24

## 2020-01-24 RX ORDER — ONDANSETRON 4 MG/1
4 TABLET, ORALLY DISINTEGRATING ORAL EVERY 6 HOURS PRN
Status: DISCONTINUED | OUTPATIENT
Start: 2020-01-24 | End: 2020-01-24

## 2020-01-24 RX ORDER — CEFAZOLIN SODIUM 1 G/3ML
INJECTION, POWDER, FOR SOLUTION INTRAMUSCULAR; INTRAVENOUS PRN
Status: DISCONTINUED | OUTPATIENT
Start: 2020-01-24 | End: 2020-01-24 | Stop reason: SURG

## 2020-01-24 RX ORDER — MEPERIDINE HYDROCHLORIDE 25 MG/ML
12.5 INJECTION INTRAMUSCULAR; INTRAVENOUS; SUBCUTANEOUS
Status: DISCONTINUED | OUTPATIENT
Start: 2020-01-24 | End: 2020-01-24 | Stop reason: HOSPADM

## 2020-01-24 RX ORDER — HYDROMORPHONE HYDROCHLORIDE 1 MG/ML
0.2 INJECTION, SOLUTION INTRAMUSCULAR; INTRAVENOUS; SUBCUTANEOUS
Status: DISCONTINUED | OUTPATIENT
Start: 2020-01-24 | End: 2020-01-24 | Stop reason: HOSPADM

## 2020-01-24 RX ORDER — SODIUM CHLORIDE 9 MG/ML
INJECTION, SOLUTION INTRAVENOUS
Status: ACTIVE
Start: 2020-01-24 | End: 2020-01-24

## 2020-01-24 RX ORDER — SIMETHICONE 80 MG
80 TABLET,CHEWABLE ORAL 4 TIMES DAILY PRN
Status: DISCONTINUED | OUTPATIENT
Start: 2020-01-24 | End: 2020-01-27 | Stop reason: HOSPADM

## 2020-01-24 RX ORDER — SODIUM CHLORIDE, SODIUM LACTATE, POTASSIUM CHLORIDE, CALCIUM CHLORIDE 600; 310; 30; 20 MG/100ML; MG/100ML; MG/100ML; MG/100ML
INJECTION, SOLUTION INTRAVENOUS CONTINUOUS
Status: DISCONTINUED | OUTPATIENT
Start: 2020-01-24 | End: 2020-01-27 | Stop reason: HOSPADM

## 2020-01-24 RX ORDER — BUPIVACAINE HYDROCHLORIDE 2.5 MG/ML
INJECTION, SOLUTION EPIDURAL; INFILTRATION; INTRACAUDAL
Status: COMPLETED | OUTPATIENT
Start: 2020-01-24 | End: 2020-01-24

## 2020-01-24 RX ORDER — MORPHINE SULFATE 4 MG/ML
4 INJECTION, SOLUTION INTRAMUSCULAR; INTRAVENOUS
Status: DISCONTINUED | OUTPATIENT
Start: 2020-01-24 | End: 2020-01-27 | Stop reason: HOSPADM

## 2020-01-24 RX ORDER — SODIUM CHLORIDE, SODIUM LACTATE, POTASSIUM CHLORIDE, CALCIUM CHLORIDE 600; 310; 30; 20 MG/100ML; MG/100ML; MG/100ML; MG/100ML
INJECTION, SOLUTION INTRAVENOUS CONTINUOUS
Status: DISCONTINUED | OUTPATIENT
Start: 2020-01-24 | End: 2020-01-25

## 2020-01-24 RX ORDER — LIDOCAINE HYDROCHLORIDE 10 MG/ML
INJECTION, SOLUTION INFILTRATION; PERINEURAL
Status: ACTIVE
Start: 2020-01-24 | End: 2020-01-24

## 2020-01-24 RX ORDER — CITRIC ACID/SODIUM CITRATE 334-500MG
30 SOLUTION, ORAL ORAL ONCE
Status: COMPLETED | OUTPATIENT
Start: 2020-01-24 | End: 2020-01-24

## 2020-01-24 RX ORDER — OXYCODONE HYDROCHLORIDE AND ACETAMINOPHEN 5; 325 MG/1; MG/1
2 TABLET ORAL EVERY 4 HOURS PRN
Status: DISCONTINUED | OUTPATIENT
Start: 2020-01-24 | End: 2020-01-24

## 2020-01-24 RX ADMIN — MORPHINE SULFATE 0.1 MG: 0.5 INJECTION, SOLUTION EPIDURAL; INTRATHECAL; INTRAVENOUS at 20:38

## 2020-01-24 RX ADMIN — SODIUM CHLORIDE 2.5 MILLION UNITS: 9 INJECTION, SOLUTION INTRAVENOUS at 12:36

## 2020-01-24 RX ADMIN — KETOROLAC TROMETHAMINE 30 MG: 30 INJECTION, SOLUTION INTRAMUSCULAR at 21:31

## 2020-01-24 RX ADMIN — BUPIVACAINE HYDROCHLORIDE 7 ML: 2.5 INJECTION, SOLUTION EPIDURAL; INFILTRATION; INTRACAUDAL; PERINEURAL at 09:21

## 2020-01-24 RX ADMIN — SODIUM CHLORIDE 5 MILLION UNITS: 900 INJECTION INTRAVENOUS at 01:43

## 2020-01-24 RX ADMIN — SODIUM CHLORIDE, POTASSIUM CHLORIDE, SODIUM LACTATE AND CALCIUM CHLORIDE: 600; 310; 30; 20 INJECTION, SOLUTION INTRAVENOUS at 04:44

## 2020-01-24 RX ADMIN — SODIUM CHLORIDE, SODIUM GLUCONATE, SODIUM ACETATE, POTASSIUM CHLORIDE AND MAGNESIUM CHLORIDE 1500 ML: 526; 502; 368; 37; 30 INJECTION, SOLUTION INTRAVENOUS at 19:35

## 2020-01-24 RX ADMIN — CEFAZOLIN 2 G: 330 INJECTION, POWDER, FOR SOLUTION INTRAMUSCULAR; INTRAVENOUS at 20:39

## 2020-01-24 RX ADMIN — METHYLERGONOVINE MALEATE 200 MCG: 0.2 INJECTION, SOLUTION INTRAMUSCULAR; INTRAVENOUS at 21:01

## 2020-01-24 RX ADMIN — OXYTOCIN 20 UNITS: 10 INJECTION, SOLUTION INTRAMUSCULAR; INTRAVENOUS at 21:29

## 2020-01-24 RX ADMIN — ACETAMINOPHEN 650 MG: 325 TABLET, FILM COATED ORAL at 23:51

## 2020-01-24 RX ADMIN — BUPIVACAINE HYDROCHLORIDE 7 ML: 2.5 INJECTION, SOLUTION EPIDURAL; INFILTRATION; INTRACAUDAL; PERINEURAL at 08:30

## 2020-01-24 RX ADMIN — BUPIVACAINE HYDROCHLORIDE 5 ML: 2.5 INJECTION, SOLUTION EPIDURAL; INFILTRATION; INTRACAUDAL; PERINEURAL at 01:55

## 2020-01-24 RX ADMIN — HYDROXYZINE HYDROCHLORIDE 50 MG: 50 TABLET, FILM COATED ORAL at 17:49

## 2020-01-24 RX ADMIN — ONDANSETRON 4 MG: 2 INJECTION INTRAMUSCULAR; INTRAVENOUS at 21:30

## 2020-01-24 RX ADMIN — SODIUM CHLORIDE, POTASSIUM CHLORIDE, SODIUM LACTATE AND CALCIUM CHLORIDE: 600; 310; 30; 20 INJECTION, SOLUTION INTRAVENOUS at 16:09

## 2020-01-24 RX ADMIN — FENTANYL CITRATE 50 MCG: 0.05 INJECTION, SOLUTION INTRAMUSCULAR; INTRAVENOUS at 14:24

## 2020-01-24 RX ADMIN — FENTANYL CITRATE 100 MCG: 0.05 INJECTION, SOLUTION INTRAMUSCULAR; INTRAVENOUS at 19:30

## 2020-01-24 RX ADMIN — ROPIVACAINE HYDROCHLORIDE 200 MG: 2 INJECTION, SOLUTION EPIDURAL; INFILTRATION at 02:20

## 2020-01-24 RX ADMIN — FENTANYL CITRATE 100 MCG: 50 INJECTION, SOLUTION INTRAMUSCULAR; INTRAVENOUS at 01:55

## 2020-01-24 RX ADMIN — OXYTOCIN 1000 ML: 10 INJECTION, SOLUTION INTRAMUSCULAR; INTRAVENOUS at 20:57

## 2020-01-24 RX ADMIN — ROPIVACAINE HYDROCHLORIDE 200 MG: 2 INJECTION, SOLUTION EPIDURAL; INFILTRATION at 15:35

## 2020-01-24 RX ADMIN — AZITHROMYCIN MONOHYDRATE 500 MG: 500 INJECTION, POWDER, LYOPHILIZED, FOR SOLUTION INTRAVENOUS at 19:35

## 2020-01-24 RX ADMIN — SODIUM CHLORIDE, SODIUM GLUCONATE, SODIUM ACETATE, POTASSIUM CHLORIDE AND MAGNESIUM CHLORIDE: 526; 502; 368; 37; 30 INJECTION, SOLUTION INTRAVENOUS at 01:52

## 2020-01-24 RX ADMIN — METOCLOPRAMIDE 10 MG: 5 INJECTION, SOLUTION INTRAMUSCULAR; INTRAVENOUS at 19:53

## 2020-01-24 RX ADMIN — FAMOTIDINE 20 MG: 10 INJECTION INTRAVENOUS at 19:53

## 2020-01-24 RX ADMIN — FENTANYL CITRATE 100 MCG: 50 INJECTION, SOLUTION INTRAMUSCULAR; INTRAVENOUS at 09:21

## 2020-01-24 RX ADMIN — SODIUM CHLORIDE 2.5 MILLION UNITS: 9 INJECTION, SOLUTION INTRAVENOUS at 06:57

## 2020-01-24 RX ADMIN — ROPIVACAINE HYDROCHLORIDE 200 MG: 2 INJECTION, SOLUTION EPIDURAL; INFILTRATION at 09:28

## 2020-01-24 RX ADMIN — SODIUM CITRATE AND CITRIC ACID MONOHYDRATE 30 ML: 500; 334 SOLUTION ORAL at 19:53

## 2020-01-24 RX ADMIN — SODIUM CHLORIDE 2.5 MILLION UNITS: 9 INJECTION, SOLUTION INTRAVENOUS at 16:16

## 2020-01-24 RX ADMIN — BUPIVACAINE HYDROCHLORIDE IN DEXTROSE 1.8 ML: 7.5 INJECTION, SOLUTION SUBARACHNOID at 20:38

## 2020-01-24 RX ADMIN — OXYTOCIN 2 MILLI-UNITS/MIN: 10 INJECTION, SOLUTION INTRAMUSCULAR; INTRAVENOUS at 04:44

## 2020-01-24 RX ADMIN — SODIUM CHLORIDE, POTASSIUM CHLORIDE, SODIUM LACTATE AND CALCIUM CHLORIDE 1000 ML: 600; 310; 30; 20 INJECTION, SOLUTION INTRAVENOUS at 09:40

## 2020-01-24 ASSESSMENT — LIFESTYLE VARIABLES
EVER_SMOKED: NEVER
ALCOHOL_USE: NO
DOES PATIENT WANT TO STOP DRINKING: NO

## 2020-01-24 ASSESSMENT — PATIENT HEALTH QUESTIONNAIRE - PHQ9
2. FEELING DOWN, DEPRESSED, IRRITABLE, OR HOPELESS: NOT AT ALL
1. LITTLE INTEREST OR PLEASURE IN DOING THINGS: NOT AT ALL
SUM OF ALL RESPONSES TO PHQ9 QUESTIONS 1 AND 2: 0

## 2020-01-24 ASSESSMENT — PAIN SCALES - GENERAL: PAIN_LEVEL: 0

## 2020-01-24 NOTE — H&P
History and Physical      Linda Miller is a 29 y.o. female  at 40w3d who presents for contractions    Subjective:   positive  For CTXS.   positive Feels pain   positive for LOF 11:18 pm  positive for vaginal bleeding.   positive for fetal movement    ROS: A comprehensive review of systems was negative.    Past Medical History:   Diagnosis Date   • Anesthesia 01/15/2019    PONV   • Bowel habit changes 01/15/2019    infrequent bowel movements     Past Surgical History:   Procedure Laterality Date   • ACL RECONSTRUCTION SCOPE Left 2019    Procedure: ACL RECONSTRUCTION SCOPE - W/BONE TENDON BONE AUTOGRAFT;  Surgeon: Antwon Garrido M.D.;  Location: SURGERY [facility];  Service: Orthopedics   • MENISCECTOMY Left 2019    Procedure: MENISCECTOMY - PARTIAL LATERAL;  Surgeon: Antwon Garrido M.D.;  Location: SURGERY [facility];  Service: Orthopedics   • COLONOSCOPY     • TONSILLECTOMY     • DENTAL EXTRACTION(S)      wisdom teeth extraction   • ADENOIDECTOMY           No family history on file.  OB History    Para Term  AB Living   1             SAB TAB Ectopic Molar Multiple Live Births                    # Outcome Date GA Lbr Chema/2nd Weight Sex Delivery Anes PTL Lv   1 Current              Social History     Socioeconomic History   • Marital status:      Spouse name: Not on file   • Number of children: Not on file   • Years of education: Not on file   • Highest education level: Not on file   Occupational History   • Not on file   Social Needs   • Financial resource strain: Not on file   • Food insecurity:     Worry: Not on file     Inability: Not on file   • Transportation needs:     Medical: Not on file     Non-medical: Not on file   Tobacco Use   • Smoking status: Never Smoker   • Smokeless tobacco: Never Used   Substance and Sexual Activity   • Alcohol use: Yes     Comment: 2 per day   • Drug use: Yes     Comment: marijuana   • Sexual  activity: Not on file   Lifestyle   • Physical activity:     Days per week: Not on file     Minutes per session: Not on file   • Stress: Not on file   Relationships   • Social connections:     Talks on phone: Not on file     Gets together: Not on file     Attends Yarsanism service: Not on file     Active member of club or organization: Not on file     Attends meetings of clubs or organizations: Not on file     Relationship status: Not on file   • Intimate partner violence:     Fear of current or ex partner: Not on file     Emotionally abused: Not on file     Physically abused: Not on file     Forced sexual activity: Not on file   Other Topics Concern   • Not on file   Social History Narrative   • Not on file     Allergies: Levaquin    Current Facility-Administered Medications:   •  PENICILLIN G POTASSIUM [id number] UNITS INJ SOLR, , , ,   •  SODIUM CHLORIDE 0.9 % IV SOLN, , , ,   •  ROPIVACAINE HCL 2 MG/ML INJ SOLN, , , ,   •  LACTATED RINGERS IV SOLN, , , ,   •  fentaNYL (SUBLIMAZE) injection 50 mcg, 50 mcg, Intravenous, Q HOUR PRN, Arlene Peters M.D.  •  fentaNYL (SUBLIMAZE) injection 100 mcg, 100 mcg, Intravenous, Q HOUR PRN, Arlene Peters M.D.  •  [COMPLETED] penicillin G potassium 5 Million Units in  mL IVPB, 5 Million Units, Intravenous, Once, Stopped at 01/24/20 0213 **FOLLOWED BY** penicillin G potassium 2.5 Million Units in  mL IVPB, 2.5 Million Units, Intravenous, Q4HRS, Arlene Peters M.D.  •  miSOPROStol (CYTOTEC) tablet 800 mcg, 800 mcg, Rectal, Once PRN, Arlene Peters M.D.  •  methylergonovine (METHERGINE) injection 0.2 mg, 0.2 mg, Intramuscular, Once PRN, Arlene Peters M.D.  •  carboPROST (HEMABATE) injection 250 mcg, 250 mcg, Intramuscular, Once PRN, Arlene Peters M.D.    Prenatal care with Dr Lam starting at 10 weeks with following problems:  There are no active problems to display for this patient.    A+/-, Rubella equivocal  GBS pos  HIV NR  Hep B NR  RPR  "NR          Objective:      /77   Pulse 66   Temp 36.4 °C (97.5 °F) (Temporal)   Ht 1.651 m (5' 5\")   Wt 88.5 kg (195 lb)     General:   no acute distress   Skin:   normal   HEENT:  Neck supple with midline trachea   Lungs:   CTA bilateral   Heart:   S1, S2 normal, no murmur, click, rub or gallop, regular rate and rhythm, brisk carotid upstroke without bruits, peripheral pulses very brisk, chest is clear without rales or wheezing, no pedal edema, no JVD, no hepatosplenomegaly   Abdomen:   gravid, NT   EFW:  9 lbs   Pelvis:  adequate with gynecoid pelvis   FHT:  110 BPM   Uterine Size: S>D   Presentations: Cephalic   Cervix:     Dilation: 3cm    Effacement: 75%    Station:  -2    Consistency: Medium    Position: Middle     Lab Review  Lab:   Blood type: A +     Recent Results (from the past 5880 hour(s))   NIPT FOR FETAL ANEUPLOIDY (PANORAMA) WITH MICRODELETIONS    Collection Time: 08/02/19  9:40 AM   Result Value Ref Range    Maternal Weight (pounds) 148     Gestation Age 15     Gestational Age at Draw (days) 3     Report Fetal Sex? Yes     Trisomy 21 Low risk     Trisomy 18 Low risk     Trisomy 13 Low risk     Monosomy X Low risk     Triploidy/Vanishing Twin Low risk     22q11.2 Deletion Syndrome Low risk     del 1p36 syndrome Low risk     Angelman syndrome Low risk     Cri-du-chat syndrome Low risk     Prader-Willi syndrome Low risk     Fetal Sex Male     Fetal Fraction 7.8     Result Summary Low risk     EER Fetal Aneuploidy w/Microdeletions See Note    CBC WITHOUT DIFFERENTIAL    Collection Time: 10/28/19 11:07 AM   Result Value Ref Range    WBC 9.7 4.8 - 10.8 K/uL    RBC 4.50 4.20 - 5.40 M/uL    Hemoglobin 13.7 12.0 - 16.0 g/dL    Hematocrit 43.4 37.0 - 47.0 %    MCV 96.4 81.4 - 97.8 fL    MCH 30.4 27.0 - 33.0 pg    MCHC 31.6 (L) 33.6 - 35.0 g/dL    RDW 47.2 35.9 - 50.0 fL    Platelet Count 127 (L) 164 - 446 K/uL    MPV 11.3 9.0 - 12.9 fL   GLUCOSE 1HR GESTATIONAL    Collection Time: 10/28/19 11:07 AM "   Result Value Ref Range    Glucose, Post Dose 99 70 - 139 mg/dL   T.PALLIDUM AB EIA    Collection Time: 10/28/19 11:07 AM   Result Value Ref Range    Syphilis, Treponemal Qual Non Reactive Non Reactive   CBC WITHOUT DIFFERENTIAL    Collection Time: 11/22/19 10:15 AM   Result Value Ref Range    WBC 12.1 (H) 4.8 - 10.8 K/uL    RBC 4.28 4.20 - 5.40 M/uL    Hemoglobin 12.8 12.0 - 16.0 g/dL    Hematocrit 40.2 37.0 - 47.0 %    MCV 93.9 81.4 - 97.8 fL    MCH 29.9 27.0 - 33.0 pg    MCHC 31.8 (L) 33.6 - 35.0 g/dL    RDW 45.7 35.9 - 50.0 fL    Platelet Count 176 164 - 446 K/uL    MPV 10.9 9.0 - 12.9 fL   GRP B STREP, BY PCR (BURROUGHS BROTH)    Collection Time: 12/30/19  7:38 PM   Result Value Ref Range    Strep Gp B DNA PCR POSITIVE (A) Negative   AMBIGUOUS DATE/TIME    Collection Time: 12/30/19  8:45 PM   Result Value Ref Range    Significant Indicator .     Source GEN     Site -     Ambiguous Date/Time       This specimen was received from your office without a  collection date and/or time. Collection date and/or time  defaulted to receipt date and/or time.     CBC WITHOUT DIFFERENTIAL    Collection Time: 01/22/20 12:00 PM   Result Value Ref Range    WBC 12.0 (H) 4.8 - 10.8 K/uL    RBC 4.83 4.20 - 5.40 M/uL    Hemoglobin 14.1 12.0 - 16.0 g/dL    Hematocrit 44.3 37.0 - 47.0 %    MCV 91.7 81.4 - 97.8 fL    MCH 29.2 27.0 - 33.0 pg    MCHC 31.8 (L) 33.6 - 35.0 g/dL    RDW 45.9 35.9 - 50.0 fL    Platelet Count 228 164 - 446 K/uL    MPV 11.0 9.0 - 12.9 fL   FERN TEST    Collection Time: 01/24/20  1:02 AM   Result Value Ref Range    Fern Test On Amniotic Fluid see below Not present   CBC WITH DIFFERENTIAL    Collection Time: 01/24/20  1:20 AM   Result Value Ref Range    WBC 16.3 (H) 4.8 - 10.8 K/uL    RBC 4.68 4.20 - 5.40 M/uL    Hemoglobin 14.2 12.0 - 16.0 g/dL    Hematocrit 41.3 37.0 - 47.0 %    MCV 88.2 81.4 - 97.8 fL    MCH 30.3 27.0 - 33.0 pg    MCHC 34.4 33.6 - 35.0 g/dL    RDW 43.8 35.9 - 50.0 fL    Platelet Count 235 164 - 446  K/uL    MPV 10.9 9.0 - 12.9 fL    Neutrophils-Polys 60.70 44.00 - 72.00 %    Lymphocytes 26.80 22.00 - 41.00 %    Monocytes 9.30 0.00 - 13.40 %    Eosinophils 1.50 0.00 - 6.90 %    Basophils 0.50 0.00 - 1.80 %    Immature Granulocytes 1.20 (H) 0.00 - 0.90 %    Nucleated RBC 0.00 /100 WBC    Neutrophils (Absolute) 9.88 (H) 2.00 - 7.15 K/uL    Lymphs (Absolute) 4.35 1.00 - 4.80 K/uL    Monos (Absolute) 1.51 (H) 0.00 - 0.85 K/uL    Eos (Absolute) 0.24 0.00 - 0.51 K/uL    Baso (Absolute) 0.08 0.00 - 0.12 K/uL    Immature Granulocytes (abs) 0.19 (H) 0.00 - 0.11 K/uL    NRBC (Absolute) 0.00 K/uL   HOLD BLOOD BANK SPECIMEN (NOT TESTED)    Collection Time: 01/24/20  1:20 AM   Result Value Ref Range    Holding Tube - Bb DONE         Assessment:   Linda Miller at 40w3d  Labor status: SROM and Active phase labor.  Obstetrical history significant for There are no active problems to display for this patient.  .      Plan:     Admit to L&D  GBS positive

## 2020-01-24 NOTE — PROGRESS NOTES
", [facility] , [place] 40w3d. Pt presents to L&D with c/o LOF since  and UCs growing stronger and closer together. Pt denies VB and reports + fetal movement. Pt escorted to triage room, oriented to room and unit, and external monitors applied. POC discussed with pt and s/o and encouraged to state needs or questions at any time.    0102 Sterile speculum exam by RAYSA Lassiter RN, pooling of bloody fluid noted. Sample collected for fern test. SVE attempted with pt's permission, pt withdrew from RN stating \"I can't, I need to stand up.\" Pt refusing another SVE at this time due to labor discomfort.    0125 Dr. Peters called and given report, admission order received.    0129 Pt transferred to labor room via wheelchair. Report given to ROOSEVELT German RN at bedside.  "

## 2020-01-24 NOTE — PROGRESS NOTES
0230: Report received from ROMAN Denny. POC discussed.  0700: Report given to Roman Medrano. POC discussed

## 2020-01-24 NOTE — PROGRESS NOTES
"Obstetrics and Gynecology  Labor and Delivery Progress Note    ID/CC: 29 y.o. is a  at 40w3d, labor    S: Pt had epidural replaced but still noting a window in the lower abdomen that is quite uncomfortable.    O: /60   Pulse 67   Temp 37.7 °C (99.8 °F) (Temporal)   Ht 1.651 m (5' 5\")   Wt 88.5 kg (195 lb)   LMP 2018 (Approximate)   BMI 32.45 kg/m²    Gen: NAD, AAO  FHT: 125/mod owen/+accels, just had a variable decel (13:21) <60sec to 70s during position change  [place]: q3-4min  SVE: 6-7cm per RN report    A/P: Linda Miller is a 29 y.o.  at 40w3d, labor.  AVSS.  Cat II FHT.  *Labor: Making cervical change.  Anticipate vaginal delivery.   - Recheck cx after epidural replaced or as clinically indicated.    *FWB: FHT have been overall reassuring, reactive, but now Cat II FHT 2/2 recent decel.     - CEFM.  *Pain: Epidural replaced but window still noted.    Tony Lindquist M.D., 2020, 1:07 PM     "

## 2020-01-24 NOTE — ANESTHESIA PREPROCEDURE EVALUATION
Relevant Problems   No relevant active problems       Physical Exam    Airway   Mallampati: II  TM distance: >3 FB  Neck ROM: full       Cardiovascular - normal exam  Rhythm: regular  Rate: normal  (-) murmur     Dental - normal exam         Pulmonary - normal exam  Breath sounds clear to auscultation     Abdominal    Neurological - normal exam                 Anesthesia Plan    ASA 2- EMERGENT (failure to progress in labor)   ASA physical status emergent criteria: other (comment)    Plan - spinal   Neuraxial block will be primary anesthetic            Postoperative Plan: Postoperative administration of opioids is intended.    Pertinent diagnostic labs and testing reviewed    Informed Consent:    Anesthetic plan and risks discussed with patient.    Use of blood products discussed with: patient whom.

## 2020-01-24 NOTE — ANESTHESIA PROCEDURE NOTES
Epidural Block  Date/Time: 1/24/2020 9:16 AM  Performed by: Tobey Gansert, M.D.  Authorized by: Tobey Gansert, M.D.     Patient Location:  OB  Start Time:  1/24/2020 9:16 AM  End Time:  1/24/2020 9:25 AM  Reason for Block: labor analgesia    patient identified, IV checked, site marked, risks and benefits discussed, surgical consent, monitors and equipment checked, pre-op evaluation and timeout performed    Patient Position:  Sitting  Prep: ChloraPrep, patient draped and sterile technique    Monitoring:  Blood pressure, continuous pulse oximetry and heart rate  Approach:  Midline  Location:  L4-L5  Injection Technique:  YOVANI saline  Skin infiltration:  Lidocaine  Strength:  1%  Dose:  3ml  Needle Type:  Tuohy  Needle Gauge:  17 G  Needle Length:  3.5 in  Loss of resistance::  5  Catheter Size:  19 G  Catheter at Skin Depth:  10  Test Dose:  Lidocaine 1.5% with epinephrine 1-to-200,000

## 2020-01-24 NOTE — PROGRESS NOTES
"Obstetrics and Gynecology  Labor and Delivery Progress Note    ID/CC: 29 y.o. is a  at 40w3d, labor    S: uncomfortable with CTX, and has not been getting relief from her epidural.    O: /60   Pulse 67   Temp 37.7 °C (99.8 °F) (Temporal)   Ht 1.651 m (5' 5\")   Wt 88.5 kg (195 lb)   LMP 2018 (Approximate)   BMI 32.45 kg/m²    Gen: NAD, AAO  FHT: 120/mod owen/+accels, -decels  [place]: q3-4min  SVE: deferred    A/P: Linda Miller is a 29 y.o.  at 40w3d, labor.  AVSS.  Cat I FHT.  *Labor: Making cervical change.  Anticipate vaginal delivery.   - Recheck cx after epidural replaced or as clinically indicated.    *FWB: Reassuring, reactive, Cat I FHT.     - CEFM.  *Pain: Epidural will be replaced as not providing good relief despite attempts at boluses.    Tony Lindquist M.D., 2020, 9:17 AM     "

## 2020-01-24 NOTE — PROGRESS NOTES
0132- Pt transferred to s222 via wheelchair. Report received from RAYSA Lassiter RN. POC discussed. Pt requesting epidural, epidural prep started. Pt standing at bedside swaying through UC's.     0155- SVE by MARJ Smith RN to confirm baby is vertex    0201- Dr. rCisostomo to bedside for epidural palcement. MHR tracing [id number] for sitting epidural position. Test dose negative at 0207. Epidural placed w/ no complications.     0230- Bedside report given to JEREMIAS Talbert RN

## 2020-01-24 NOTE — ANESTHESIA PROCEDURE NOTES
Epidural Block  Date/Time: 1/24/2020 1:55 AM  Performed by: Sonido Crisostomo M.D.  Authorized by: Sonido Crisostomo M.D.     Patient Location:  OB  Start Time:  1/24/2020 1:55 AM  End Time:  1/24/2020 1:56 AM  Reason for Block: labor analgesia    patient identified, IV checked, site marked, risks and benefits discussed, surgical consent, monitors and equipment checked, pre-op evaluation and timeout performed    Patient Position:  Sitting  Prep: ChloraPrep, patient draped and sterile technique    Monitoring:  Blood pressure, continuous pulse oximetry and heart rate  Approach:  Midline  Location:  L1-L2  Injection Technique:  YOVANI saline  Skin infiltration:  Lidocaine  Strength:  1%  Dose:  3ml  Needle Type:  Tuohy  Needle Gauge:  17 G  Needle Length:  3.5 in  Loss of resistance::  5  Catheter Size:  19 G  Catheter at Skin Depth:  11  Test Dose:  Lidocaine 1.5% with epinephrine 1-to-200,000

## 2020-01-24 NOTE — PROGRESS NOTES
Assumed care of PT.    0700 PT repositioned on right side.    0800 PT called out, PT is very uncomfortable and stated that her epidural is not helping, she stated she is having constant lowed left abd pain. Instructed to push bolus button and wait 15 mins.    0855 PT has pushed bolus twice and positioned all the way to left side.  Cold alcohol swab used and PT denies feeling cold but stated she is having sharp pain lower left ABD.    0905 Anesthesia called PT will be given bolus.    0918 Anesthesia at BS, PT sitting up for 2nd epidural placement.     1230 PT still CO of unrelieved pain. SVE per doc flow    1250 PT called out still CO unrelieved pain.    1255 PT placed in hands and knees. MD at BS. PT stated that she feel relief in this position.     1320 PT back to supine position.     1330 Call to Gansert, okay to offer IV fentanyl.    Per PT she is unsure about IV meds.    1420 PT called out, requesting IV meds. 10/10 pain    1430 PT pesting, stated pain medication is helping.    1535 RN at BS, PT still not having pain relief. While turning PT tape on bottom epidural . Marielos called to BS.    1540 Gansert at BS, epidural catheter noted to be pulled out a small amount.  Epidural removed. Marielos offered to wait to see if pain increased after epidural was out to see if it was giving PT any relief. PT refused and wants one placed now.    1555 PT sitting for epidural. Per Gansert, if PT is converted to C/S epidural must be turned off for 1hr prior.    1645 Boogieol at BS, PT still not having relief. MD gave PT option to have IUPC placed to see if in adequate pattern.    1710 PT very uncomfortable but agrees to increase Pitocin, educated that UC's are not currently adequate. PT requested to continue laboring at this time.     1715 Call to BS PT stated the cant continue and she is having too much pain.  Epidural and pitocin l turned off, PT quickly changed mind. Meds restarted, Garol aware and in department.    1745  RN at  to offer meds per MAR, PT calm at this time.  Encouraged PT to breath and relax, also educated PT that if pain becomes unbearable to let me know.    1800 PT feeling pressure. SVE unchanged.    1900 Report to Nitish, PT crying and stated she cannot continue, Bharath () continued to encourage her. SVE unchanged. Lexa called.

## 2020-01-25 LAB
ERYTHROCYTE [DISTWIDTH] IN BLOOD BY AUTOMATED COUNT: 46 FL (ref 35.9–50)
HCT VFR BLD AUTO: 39.8 % (ref 37–47)
HGB BLD-MCNC: 13.1 G/DL (ref 12–16)
MCH RBC QN AUTO: 29.8 PG (ref 27–33)
MCHC RBC AUTO-ENTMCNC: 32.9 G/DL (ref 33.6–35)
MCV RBC AUTO: 90.7 FL (ref 81.4–97.8)
PLATELET # BLD AUTO: 182 K/UL (ref 164–446)
PMV BLD AUTO: 10.9 FL (ref 9–12.9)
RBC # BLD AUTO: 4.39 M/UL (ref 4.2–5.4)
WBC # BLD AUTO: 20.7 K/UL (ref 4.8–10.8)

## 2020-01-25 PROCEDURE — 36415 COLL VENOUS BLD VENIPUNCTURE: CPT

## 2020-01-25 PROCEDURE — A9270 NON-COVERED ITEM OR SERVICE: HCPCS | Performed by: OBSTETRICS & GYNECOLOGY

## 2020-01-25 PROCEDURE — 700111 HCHG RX REV CODE 636 W/ 250 OVERRIDE (IP): Performed by: OBSTETRICS & GYNECOLOGY

## 2020-01-25 PROCEDURE — 700102 HCHG RX REV CODE 250 W/ 637 OVERRIDE(OP): Performed by: OBSTETRICS & GYNECOLOGY

## 2020-01-25 PROCEDURE — 90686 IIV4 VACC NO PRSV 0.5 ML IM: CPT | Performed by: OBSTETRICS & GYNECOLOGY

## 2020-01-25 PROCEDURE — 85027 COMPLETE CBC AUTOMATED: CPT

## 2020-01-25 PROCEDURE — 700112 HCHG RX REV CODE 229: Performed by: OBSTETRICS & GYNECOLOGY

## 2020-01-25 PROCEDURE — 770002 HCHG ROOM/CARE - OB PRIVATE (112)

## 2020-01-25 PROCEDURE — 90471 IMMUNIZATION ADMIN: CPT

## 2020-01-25 RX ADMIN — KETOROLAC TROMETHAMINE 30 MG: 30 INJECTION, SOLUTION INTRAMUSCULAR; INTRAVENOUS at 15:32

## 2020-01-25 RX ADMIN — ACETAMINOPHEN 650 MG: 325 TABLET, FILM COATED ORAL at 05:23

## 2020-01-25 RX ADMIN — KETOROLAC TROMETHAMINE 30 MG: 30 INJECTION, SOLUTION INTRAMUSCULAR; INTRAVENOUS at 03:56

## 2020-01-25 RX ADMIN — ACETAMINOPHEN 650 MG: 325 TABLET, FILM COATED ORAL at 12:13

## 2020-01-25 RX ADMIN — DOCUSATE SODIUM 100 MG: 100 CAPSULE, LIQUID FILLED ORAL at 20:28

## 2020-01-25 RX ADMIN — SIMETHICONE CHEW TAB 80 MG 80 MG: 80 TABLET ORAL at 20:28

## 2020-01-25 RX ADMIN — KETOROLAC TROMETHAMINE 30 MG: 30 INJECTION, SOLUTION INTRAMUSCULAR; INTRAVENOUS at 09:38

## 2020-01-25 RX ADMIN — ACETAMINOPHEN 650 MG: 325 TABLET, FILM COATED ORAL at 18:18

## 2020-01-25 RX ADMIN — INFLUENZA A VIRUS A/BRISBANE/02/2018 IVR-190 (H1N1) ANTIGEN (FORMALDEHYDE INACTIVATED), INFLUENZA A VIRUS A/KANSAS/14/2017 X-327 (H3N2) ANTIGEN (FORMALDEHYDE INACTIVATED), INFLUENZA B VIRUS B/PHUKET/3073/2013 ANTIGEN (FORMALDEHYDE INACTIVATED), AND INFLUENZA B VIRUS B/MARYLAND/15/2016 BX-69A ANTIGEN (FORMALDEHYDE INACTIVATED) 0.5 ML: 15; 15; 15; 15 INJECTION, SUSPENSION INTRAMUSCULAR at 05:32

## 2020-01-25 RX ADMIN — OXYCODONE HYDROCHLORIDE 5 MG: 5 TABLET ORAL at 20:27

## 2020-01-25 RX ADMIN — OXYCODONE HYDROCHLORIDE 5 MG: 5 TABLET ORAL at 16:32

## 2020-01-25 RX ADMIN — VITAMIN A, VITAMIN C, VITAMIN D-3, VITAMIN E, VITAMIN B-1, VITAMIN B-2, NIACIN, VITAMIN B-6, CALCIUM, IRON, ZINC, COPPER 1 TABLET: 4000; 120; 400; 22; 1.84; 3; 20; 10; 1; 12; 200; 27; 25; 2 TABLET ORAL at 05:23

## 2020-01-25 RX ADMIN — OXYCODONE HYDROCHLORIDE 5 MG: 5 TABLET ORAL at 04:06

## 2020-01-25 NOTE — PROGRESS NOTES
"1900: report received from ANMOL Medrano. POC discussed. Pt feeling more pressure- Bernard KELLY: unchanged.   1905: Dr Lindquist called to come assess. Pt states that she is \"just done\" and she \"cant do it anymore\" FOB is telling pt that she is \"almost there.\" Pt feels pressure by FOB to keep laboring but states she doesn't want to do this anymore, she is \"in so much pain.\"  1910: MD at bedside, pt decision made for P C/S. MD consented pt to surgery. Pre-op initiated.  1915: Epidural OFF, Dr Gansert called- keep epidural off for 45 min- 1 hour before placing spinal. OK to administer Fentanyl as ordered for pain until C/S.  2029: In OR 2  2136: In PACU 3  2240: Transferred to S311 in stable condition, report given to ANMOL Stout. POC discussed.   "

## 2020-01-25 NOTE — ANESTHESIA QCDR
2019 [facility] Clinical Data Registry (for Quality Improvement)     Postoperative nausea/vomiting risk protocol (Adult = 18 yrs and Pediatric 3-17 yrs)- (430 and 463)  General inhalation anesthetic (NOT TIVA) with PONV risk factors: No  Provision of anti-emetic therapy with at least 2 different classes of agents: N/A  Patient DID NOT receive anti-emetic therapy and reason is documented in Medical Record: N/A    Multimodal Pain Management- (477)  Non-emergent surgery AND patient age >= 18: No  Use of Multimodal Pain Management, two or more drugs and/or interventions, NOT including systemic opioids:   Exception: Documented allergy to multiple classes of analgesics:     Smoking Abstinence (404)  Patient is current smoker (cigarette, pipe, e-cig, marijuanna): No  Elective Surgery:   Abstinence instructions provided prior to day of surgery:   Patient abstained from smoking on day of surgery:     Pre-Op Beta-Blocker in Isolated CABG (44)  Isolated CABG AND patient age >= 18: No  Beta-blocker admin within 24 hours of surgical incision:   Exception:of medical reason(s) for not administering beta blocker within 24 hours prior to surgical incision (e.g., not  indicated,other medical reason):     PACU assessment of acute postoperative pain prior to Anesthesia Care End- Applies to Patients Age = 18- (ABG7)  Initial PACU pain score is which of the following: < 7/10  Patient unable to report pain score: N/A    Post-anesthetic transfer of care checklist/protocol to PACU/ICU- (426 and 427)  Upon conclusion of case, patient transferred to which of the following locations: PACU/Non-ICU  Use of transfer checklist/protocol: Yes  Exclusion: Service Performed in Patient Hospital Room (and thus did not require transfer): N/A  Unplanned admission to ICU related to anesthesia service up through end of PACU care- (MD51)  Unplanned admission to ICU (not initially anticipated at anesthesia start time): No

## 2020-01-25 NOTE — PROGRESS NOTES
Assessed patient, vital signs stable, discussed plan of care, patient would like pain medication when it is due.

## 2020-01-25 NOTE — ANESTHESIA PROCEDURE NOTES
Spinal Block  Date/Time: 1/24/2020 8:38 PM  Performed by: Tobey Gansert, M.D.  Authorized by: Tobey Gansert, M.D.     Start Time:  1/24/2020 8:38 PM  End Time:  1/24/2020 8:43 PM  Reason for Block: primary anesthetic    patient identified, IV checked, site marked, risks and benefits discussed, surgical consent, monitors and equipment checked, pre-op evaluation and timeout performed    Patient Position:  Sitting  Prep: ChloraPrep, patient draped and sterile technique    Monitoring:  Blood pressure, continuous pulse oximetry and heart rate  Approach:  Midline  Location:  L4-5  Injection Technique:  Single-shot  Skin infiltration:  Lidocaine  Strength:  1%  Dose:  3ml  Needle Type:  Pencan  Needle Gauge:  25 G  CSF flowing pre/post injection:  Yes  Sensory Level:  T6

## 2020-01-25 NOTE — ANESTHESIA POSTPROCEDURE EVALUATION
Patient: Linda Miller    Procedure Summary     Date:  20 Room / Location:  [facility] OR 02 / LABOR AND DELIVERY    Anesthesia Start:  152 Anesthesia Stop:      Procedure:   SECTION, PRIMARY (Abdomen) Diagnosis:        delivery delivered      (bryanna rivas)    Surgeon:  Tony Lindquist M.D. Responsible Provider:  Tobey Gansert, M.D.    Anesthesia Type:  epidural ASA Status:  2 - Emergent          Final Anesthesia Type: epidural  Last vitals  BP   Blood Pressure: 113/56    Temp   37.3 °C (99.1 °F)    Pulse   Pulse: 62   Resp    12    SpO2   99 %      Anesthesia Post Evaluation    Patient location during evaluation: PACU  Patient participation: complete - patient participated  Level of consciousness: awake and alert  Pain score: 0    Airway patency: patent  Anesthetic complications: no  Cardiovascular status: hemodynamically stable  Respiratory status: acceptable  Hydration status: euvolemic    PONV: none

## 2020-01-25 NOTE — OP REPORT
Obstetrics and Gynecology   Operative Note    Patient: Linda Miller   Date: 2020  Surgeon: Tony Lindquist MD  Assistant: Mariajose Mcdonald MD  Pre-op diagnosis: IUP at 40w3d, labor, SROM, protracted labor, maternal intolerance of labor (epidural replaced twice)  Post-op diagnosis: same, delivered  Procedure: RLTCS via Pfannenstiel with double layer uterine closure  Anesthesia: spinal  Anesthesiologist: Tobey Gansert, M.D.  Pre-op Abx: 2g cefazolin IV, 500mg azithromycin IV  Findings:  1. Viable male infant in ROP presentation presentation.  2. Delivered at 8:54 PM  on 2020 .  3. APGARS 8 at 1 minute, 9 at 5 minutes  4. Birth Weight: 3.93 kg (8 lb 10.6 oz)   5. Normal appearing uterus, fallopian tubes and ovaries.   6. Meconium stained amniotic fluid.  7. Normal appearing placenta, 3VC with central cord insertion.  8. Umbilical cord gasses   2020 21:00   Cord Bg Ph 7.31   Cord Bg Pco2 45.2   Cord Bg Po2 14.3   Cord Bg Hco3 22   Cord Bg Base Excess -5   Cord Bg O2 Saturation 21.8   CV Ph 7.33   CV Pco2 37.8   CV Po2 30.6   CV Hco3 20   CV Base Excess -6   CV O2 Saturation 68.5   EBL: 400mL  UOP: 300mL, blood tinged (same prior to labor)  Fluids: 1500mL of crystaloid  Specimens/Cultures: blood gasses  Drains/Retained Objects: hahn catheter  Complications: none  Condition: good  Disposition: L&D PACU then Mother/Baby Unit  Narrative:    After having reviewed, in detail, the risks, benefits, and alternatives to  delivery, the patient signed the informed consent and wished to proceed with the procedure.  The patient was taken to the operating room with an IV in place.  She was administered spinal anesthesia without complication.  She was placed on the operating table in the dorsal supine position with a leftward tilt.  Under a sterile prep, the patient had a Hahn catheter placed in her bladder and sequential compression devices placed on her legs for venous thrombotic  prophylaxis.  She was prepped and draped in the usual sterile fashion for a  delivery, including a vaginal prep.  The adequacy of the patient's analgesia was checked with an Allis test.  The patient was adequately anesthetized.     A Pfannenstiel skin incision was made across the lower abdomen sharply with the scalpel.  This was carried down to the underlying layer of fascia with the Bovie.  The fascia was incised on either side of the midline.  This incision was then extended laterally with the Hernandez scissors.  The inferior aspect of the fascial incision was grasped with Kocher clamps and elevated.  The underlying rectal muscles were dissected off the midline raphe, both with blunt dissection as well as sharply with the Hernandez.  Attention was then turned to the superior aspect of the fascial incision, which, in a similar fashion, was grasped with Kocher clamps and elevated.  The rectus muscles were dissected off the midline raphe, both with blunt dissection and sharp dissection with the Hernandez scissor.  The rectus muscles were spread laterally from the midline.  The peritoneum was identified and entered using blunt dissection.      This incision was extended superiorly and inferiorly with good visualization of the bladder using blunt dissection, sharp dissection and with the Bovie.  The vesicouterine peritoneal reflection was noted to be low on the uterus, well out of the planned hysterotomy site.  The uterine peritoneum was grasped with smooth pickups and incised sharply with the Metzenbaum scissors.  This incision was extended superolaterally with the Metzenbaum scissors.  A bladder flap was created digitally.  The bladder blade was placed in the newly created bladder flap.     A low transverse hysterotomy was then made sharply with a scalpel to the level of the membranes.  This incision was extended superolaterally with blunt dissection.  The membranes were ruptured and the fluid character was noted as above.   The fetal head was then grasped.  The bladder blade was removed.  The fetal head was brought atraumatically through the incision.  This was followed by the remainder of the infant, also atraumatically.  The infant was delivered in whole atraumatically.  The nose and mouth were suctioned with the bulb suction.  The infant was then handed off to the awaiting pediatric resuscitation team including a respiratory therapist.      The placenta was then delivered spontaneously aided by manual expression from the uterus.  The uterus was exteriorized and cleared of all clots and debris.  The hysterotomy was inspected and no extensions were noted.  The hysterotomy was then closed in a running locked fashion with 0 Vicryl.  A second layer of the same suture was then used to create an imbricating layer.  An area near the RIGHT hysterotomy angle was mildly bleeding and was rendered hemostatic with a figure-of-eight stitch of 0 Vicryl.  A survey of the hysterotomy revealed there was excellent hemostasis.   The uterus was returned to the abdomen.  The pelvis was irrigated with warm, normal saline and suctioned clear.   Hemostasis was again confirmed. The uterus, tubes, and ovaries were then inspected with the above-findings.  The pelvis was irrigated with warm, normal saline and suctioned clear a second time and another survey of the hysterotomy revealed that was excellent hemostasis was again present.   The peritoneum was closed with a running stitch of 2-0 Vicryl and the rectus muscles were reapproximated with a horizontal mattress stitch of 2-0 Vicryl.    The subfascial structures were inspected for bleeding and small bleeding areas were rendered hemostatic with the Bovie.  No other defects were identified.  The fascia itself was inspected for bleeding and buttonhole defects, and none were present.  As such, the fascia was then closed with 0 PDS in a running fashion.  The subcutaneous tissues were irrigated with warm, normal  saline and dried with a new lap sponge.  They were then inspected for bleeding and small bleeding vessels were rendered hemostatic with the Bovie.  A subcutaneous layer of 2-0 Vicryl was then placed in a running fashion to minimize seroma formation.  The skin was then closed with 3-0 Monocryl in a running fashion.      The patient tolerated the procedure well.  Complications are as noted above.  Sponge, lap, needle, and instrument counts were reported to be correct.  The patient was taken to the recovery room in stable and awake condition with later plans to transfer to [facility].  Dr. Lindquist was present throughout and performed the entire procedure.       Tony Lindquist M.D.,  1/24/2020, 9:35 PM

## 2020-01-25 NOTE — PROGRESS NOTES
Patient up from L&D to room 311 via [facility], infant in arms. Report received from ANMOL Seymour. Bands verified, cuddles tag on flashing. IV patent running second bag of pitocin at 125 cc/hr. [place] draining to gravity, SCD's and pulse oximeter in place. Patient oriented to unit and room. Whiteboards updated, POC discussed. Call light and emergency call light use discussed. Call light within reach. Patient encouraged to call with any needs and or concerns.

## 2020-01-25 NOTE — ANESTHESIA TIME REPORT
Anesthesia Start and Stop Event Times     Date Time Event    2020 0150 Ready for Procedure     015 Anesthesia Start      Anesthesia Stop        Responsible Staff  20    Name Role Begin End    Sonido Crisostomo M.D. Anesth 0152 0707    Tobey Gansert, M.D. Anesth 0707         Preop Diagnosis (Free Text):  Pre-op Diagnosis     IUP 40.3, maternal exhaustion        Preop Diagnosis (Codes):  Diagnosis Information     Diagnosis Code(s):  delivery delivered [O82]        Post op Diagnosis  Fetal intolerance to labor, delivered, current hospitalization      Premium Reason  A. 3PM - 7AM    Comments:

## 2020-01-25 NOTE — ADDENDUM NOTE
Addendum  created 01/24/20 1226 by Tobey Gansert, M.D.    Order list changed, Order sets accessed

## 2020-01-25 NOTE — DISCHARGE PLANNING
:     Received order for a history of THC.  Infant's UDS is pending.      Plan:  Waiting on the results of infant's drug screen.

## 2020-01-25 NOTE — PROGRESS NOTES
"Department of Obstetrics and Gynecology  Post- Progress Note    CC: POD1 s/p LTCS for maternal intolerance to labor.    S: Pt feeling well.  Pain well controlled.  Baldev reg diet.  Has ambulated, still a little unsteady.  Lochia mild.  Hahn in place.  -flatus/-BM.  Pt denies CP/SOB, N/V, constipation/diarrhea, lower leg pain.      O: /62   Pulse 68   Temp 36.8 °C (98.2 °F) (Temporal)   Resp 20   Ht 1.651 m (5' 5\")   Wt 88.5 kg (195 lb)   LMP 2018 (Approximate)   SpO2 98%   Breastfeeding? Yes   BMI 32.45 kg/m² , Temp (24hrs), Av.8 °C (98.3 °F), Min:36.6 °C (97.8 °F), Max:37.3 °C (99.1 °F)       Gen: NAD, AAO    CV: RRR    Pulm: unlabored    Abd: Soft, NT, no rebound/guarding, fundus firm at U.    Incision: clean, dry, intact, with bulky dressing in place.  No erythema, induration or drainage.    Ext: WWP, trace edema, non-tender to palpation    Recent Labs     20  1200 20  0120 20  0533   WBC 12.0* 16.3* 20.7*   RBC 4.83 4.68 4.39   HEMOGLOBIN 14.1 14.2 13.1   HEMATOCRIT 44.3 41.3 39.8   MCV 91.7 88.2 90.7   MCH 29.2 30.3 29.8   RDW 45.9 43.8 46.0   PLATELETCT 228 235 182   MPV 11.0 10.9 10.9   NEUTSPOLYS  --  60.70  --    LYMPHOCYTES  --  26.80  --    MONOCYTES  --  9.30  --    EOSINOPHILS  --  1.50  --    BASOPHILS  --  0.50  --       A/P: Linda Miller is a 29 y.o.  s/p LTCS for maternal intolerance to labor, unable to get working epidural (replaced x2).  AVSS.  Recovering well.    - Continue routine postpartum care.    - Encourage ambulation.    - Continue current pain regimen    - Remove hahn when returns to room.    Anticipate d/c POD 2-3    Tony Lindquist MD, MS,  2020, 8:13 AM    "

## 2020-01-25 NOTE — H&P
History and Physical Update  29 y.o.  with IUP at 40w3d, labor, SROM.  The patient presented to labor and delivery with spontaneous rupture membranes in latent labor.  She was augmented with oxytocin.  She received an epidural for pain control.  This however did not provide adequate pain control throughout her labor process.  She had it replaced twice without significant improvement in a very specific pain above her pubic bone.  She states that this pain has become unbearable and despite now being 7 cm dilated she wishes to go forward with  as she does not feel that she can tolerate further labor.  Of note when she was approximately 6 cm dilated earlier this evening she had originally asked to go to  and then changed her mind.  She now is firm in her decision to move to .  We counseled her as noted below.  She understands that she is progressing through labor but decides to move forward with .    Discussed with the patient indications for  delivery. The patient voiced understanding of indications for  section at this time.    Discussed with the patient the risks of  delivery. Our conversation included the risks of such major surgery include which included but was not limited to infection, bleeding, blood clots, scarring, damage to other organs in the area of operation. Specifically organs that can be damaged are bowel, bladder, blood vessels, nerves, uterus, fallopian tubes, ovaries and ureters.  I also discussed with the patient the risk of wound infection and wound breakdown. I explaned that these risks are greater in people that have diabetes or obesity.  We discussed the risk of emergency blood transfusion during procedure as well as emergency hysterectomy during procedure.  We also discussed the increased risk abnormal placental implantation and possible need for repeat  section in future pregnancies.  The patient verbalized her understanding  of these risks.    Patient had the opportunity to ask questions regarding procedures. All questions answered to the patient's satisfaction.    Proceed with  delivery.    Tony Lindquist MD, MS,  2020, 7:51 PM

## 2020-01-25 NOTE — LACTATION NOTE
This note was copied from a baby's chart.  Met with mother per her request for breastfeeding assistance, assisted with and educated on proper positioning for a deep latch, initially a shallow latch noted and mother reported discomfort, with minimal assistance a change from cross cradle to football hold a deep latch with widely-flanged lips was achieved, mother denies pain with a deeper latch, a nutritive suck was noted, mother was educated on how to properly break the latch when needed, educated on expected feeding frequency and duration, educated on the benefits of [id number], encouraged frequent [id number] and especially when feeding baby, encouraged ad matias breastfeeding attempts at least Q 3 hours, encouraged to call for assistance as needed.

## 2020-01-25 NOTE — PROGRESS NOTES
"Obstetrics and Gynecology  Labor and Delivery Progress Note    ID/CC: 29 y.o. is a  at 40w3d, labor    S: Pt still having significant suprapubic discomfort.    O: /60   Pulse 67   Temp 37.7 °C (99.8 °F) (Temporal)   Ht 1.651 m (5' 5\")   Wt 88.5 kg (195 lb)   LMP 2018 (Approximate)   BMI 32.45 kg/m²    Gen: AAO,   FHT: 125/mod owen/+accels, intermittent variable decels <30sec  IUPC: CTX q3-4min, MVU ~100  SVE: 5-6/80/-1 (my exam), IUPC placed    A/P: Linda Miller is a 29 y.o.  at 40w3d, labor.  Cat II FHT.  *Labor: No cervical changes since likely 9 am but could be discrepancy between examiners.  As such have placed IUPC.  MVU inadequate   - Recheck cx when adequate for 2h or as clinically indicated.    - Increase oxytocin per protocol   *FWB: FHT have been overall reassuring, reactive, but Cat II FHT 2/2 infrequent decels. Known on U/S to be 9.5lb on U/S yesterday.     - CEFM.   - Extra support for delivery given potential for shoulders  *Pain: Epidural replaced but window still noted, not tolerating well.  Considering C/S delivery. Will try some hydroxyzine for anxiolysis.    Tony Lindquist M.D., 2020, 4:59 PM     "

## 2020-01-26 PROCEDURE — A9270 NON-COVERED ITEM OR SERVICE: HCPCS | Performed by: OBSTETRICS & GYNECOLOGY

## 2020-01-26 PROCEDURE — 700102 HCHG RX REV CODE 250 W/ 637 OVERRIDE(OP): Performed by: OBSTETRICS & GYNECOLOGY

## 2020-01-26 PROCEDURE — 770002 HCHG ROOM/CARE - OB PRIVATE (112)

## 2020-01-26 RX ORDER — IBUPROFEN 600 MG/1
600 TABLET ORAL EVERY 6 HOURS
Status: DISCONTINUED | OUTPATIENT
Start: 2020-01-26 | End: 2020-01-27 | Stop reason: HOSPADM

## 2020-01-26 RX ORDER — IBUPROFEN 600 MG/1
600 TABLET ORAL EVERY 6 HOURS
Qty: 28 TAB | Refills: 0 | Status: SHIPPED | OUTPATIENT
Start: 2020-01-26 | End: 2020-10-05

## 2020-01-26 RX ORDER — PSEUDOEPHEDRINE HCL 30 MG
100 TABLET ORAL 2 TIMES DAILY
Qty: 60 CAP | Refills: 0 | Status: SHIPPED | OUTPATIENT
Start: 2020-01-26 | End: 2020-10-05

## 2020-01-26 RX ORDER — ACETAMINOPHEN 325 MG/1
650 TABLET ORAL EVERY 6 HOURS
Qty: 28 TAB | Refills: 0 | Status: SHIPPED | OUTPATIENT
Start: 2020-01-26 | End: 2020-10-05

## 2020-01-26 RX ORDER — OXYCODONE HYDROCHLORIDE 5 MG/1
5 TABLET ORAL EVERY 6 HOURS PRN
Qty: 28 TAB | Refills: 0 | Status: SHIPPED | OUTPATIENT
Start: 2020-01-26 | End: 2020-02-02

## 2020-01-26 RX ADMIN — OXYCODONE HYDROCHLORIDE 10 MG: 10 TABLET ORAL at 18:04

## 2020-01-26 RX ADMIN — OXYCODONE HYDROCHLORIDE 10 MG: 10 TABLET ORAL at 22:03

## 2020-01-26 RX ADMIN — OXYCODONE HYDROCHLORIDE 5 MG: 5 TABLET ORAL at 10:12

## 2020-01-26 RX ADMIN — OXYCODONE HYDROCHLORIDE 5 MG: 5 TABLET ORAL at 01:18

## 2020-01-26 RX ADMIN — VITAMIN A, VITAMIN C, VITAMIN D-3, VITAMIN E, VITAMIN B-1, VITAMIN B-2, NIACIN, VITAMIN B-6, CALCIUM, IRON, ZINC, COPPER 1 TABLET: 4000; 120; 400; 22; 1.84; 3; 20; 10; 1; 12; 200; 27; 25; 2 TABLET ORAL at 05:48

## 2020-01-26 RX ADMIN — ACETAMINOPHEN 650 MG: 325 TABLET, FILM COATED ORAL at 06:34

## 2020-01-26 RX ADMIN — ACETAMINOPHEN 650 MG: 325 TABLET, FILM COATED ORAL at 12:28

## 2020-01-26 RX ADMIN — ACETAMINOPHEN 650 MG: 325 TABLET, FILM COATED ORAL at 01:13

## 2020-01-26 RX ADMIN — IBUPROFEN 600 MG: 600 TABLET ORAL at 12:28

## 2020-01-26 RX ADMIN — ACETAMINOPHEN 650 MG: 325 TABLET, FILM COATED ORAL at 18:01

## 2020-01-26 RX ADMIN — OXYCODONE HYDROCHLORIDE 5 MG: 5 TABLET ORAL at 05:51

## 2020-01-26 RX ADMIN — IBUPROFEN 600 MG: 600 TABLET ORAL at 18:01

## 2020-01-26 ASSESSMENT — EDINBURGH POSTNATAL DEPRESSION SCALE (EPDS)
I HAVE BEEN SO UNHAPPY THAT I HAVE BEEN CRYING: NO, NEVER
I HAVE LOOKED FORWARD WITH ENJOYMENT TO THINGS: AS MUCH AS I EVER DID
THE THOUGHT OF HARMING MYSELF HAS OCCURRED TO ME: NEVER
I HAVE BEEN ANXIOUS OR WORRIED FOR NO GOOD REASON: NO, NOT AT ALL
THINGS HAVE BEEN GETTING ON TOP OF ME: NO, I HAVE BEEN COPING AS WELL AS EVER
I HAVE BEEN SO UNHAPPY THAT I HAVE HAD DIFFICULTY SLEEPING: NOT AT ALL
I HAVE FELT SCARED OR PANICKY FOR NO GOOD REASON: NO, NOT AT ALL
I HAVE FELT SAD OR MISERABLE: NO, NOT AT ALL
I HAVE BLAMED MYSELF UNNECESSARILY WHEN THINGS WENT WRONG: NO, NEVER
I HAVE BEEN ABLE TO LAUGH AND SEE THE FUNNY SIDE OF THINGS: AS MUCH AS I ALWAYS COULD

## 2020-01-26 ASSESSMENT — PATIENT HEALTH QUESTIONNAIRE - PHQ9
SUM OF ALL RESPONSES TO PHQ9 QUESTIONS 1 AND 2: 0
2. FEELING DOWN, DEPRESSED, IRRITABLE, OR HOPELESS: NOT AT ALL
1. LITTLE INTEREST OR PLEASURE IN DOING THINGS: NOT AT ALL

## 2020-01-26 NOTE — LACTATION NOTE
This note was copied from a baby's chart.  Follow-up visit, informed by RN that she set mother up with breast pump due to left breast pain/nipple cracking, mother was using breast pump independently when LC arrived, she denies pain using pump, 5 ml colostrum expressed via breast pump, she states left breast hurts too much to attempt to latch at this time, assured her that it is fine and that pumping will protect milk supply while not latching to that breast, during breast assessment small crack noted on left nipple, right nipple bruising noted, mother reports that breast is sore but breastfeeding is still tolerable on that breast, mother agreed to allow LC to assist her to latch baby on her right breast, mother's initial latch appeared shallow and mother reported discomfort, after minimal assistance a small position changes a deep latch with widely-flanged lips was achieved and a nutritive suck was noted, all questions and concerns addressed, encouraged to call for latch assistance as needed, encouraged to call for assistance if needed using breast pump as well.

## 2020-01-26 NOTE — DISCHARGE SUMMARY
Obstetrics Discharge Summary    Admission Date: 2020         Discharge Date: 2020    ADMISSION DIAGNOSIS:  1. Term intrauterine pregnancy at 40+3 weeks  2. Spontaneous rupture of membranes  3. Maternal intolerance to labor     Discharge Diagnosis:  1. Term intrauterine pregnancy at 40+3 weeks  2. Spontaneous rupture of membranes  3. Maternal intolerance to labor     DETAILS OF HOSPITAL STAY  Presenting Problem/History of Present Illness: contractions    Hospital Course:  Patient is a 29 y.o.  who presented to [facility] at 40w3d weeks with a chief complaint of contractions. She had prenatal care with [facility] with Dr. Viviane Lam. Pregnancy was complicated by: n/a.     Patient was admitted for SROM at term and started on Pitocin for augmentation. She underwent Epidural anesthesia for pain control and had to have it replaced due to to continued pain. Patient achieved 7cm dilation however requested a  section due to continued pain. For full details of the operation, please refer to the operative report dictation. Briefly, the patient was taken to the operating room where a primary  section was performed. There were no complications. Estimated blood loss was 400 ml. Findings included: Viable male infant in ROP presentation presentation.  APGARS 8 at 1 minute, 9 at 5 minutes. Birth Weight: 3.93 kg (8 lb 10.6 oz). Normal appearing uterus, fallopian tubes and ovaries. Meconium stained amniotic fluid. Patient’s recovery and post-operative courses were unremarkable. By post-operative day #3, the patient met all appropriate milestones and was stable to be discharged to home.    APGARs:   8    9       COMPLICATIONS: none    PHYSICAL EXAM:  Vitals:   Vitals:    20 0600   BP: 117/69   Pulse: 62   Resp: 16   Temp: 36.6 °C (97.9 °F)   SpO2: 95%     General: well and resting  Chest/Breasts: deferred   Cardiac: Regular rate  Lungs: CTABL  Abdomen:              Fundus: firm, below umbilicus  and nontender            Incision: Steri strips in place, no surrounding errythema. Incision clean, dry and intact  Perineum: deferred  Extremities: no edema, moves all    LABS/STUDIES:   Results for LINDA PICHARDO (MRN [id number]) as of 1/26/2020 13:11   Ref. Range 1/24/2020 01:20 1/25/2020 05:33   WBC Latest Ref Range: 4.8 - 10.8 K/uL 16.3 (H) 20.7 (H)   RBC Latest Ref Range: 4.20 - 5.40 M/uL 4.68 4.39   Hemoglobin Latest Ref Range: 12.0 - 16.0 g/dL 14.2 13.1   Hematocrit Latest Ref Range: 37.0 - 47.0 % 41.3 39.8   MCV Latest Ref Range: 81.4 - 97.8 fL 88.2 90.7   MCH Latest Ref Range: 27.0 - 33.0 pg 30.3 29.8   MCHC Latest Ref Range: 33.6 - 35.0 g/dL 34.4 32.9 (L)   RDW Latest Ref Range: 35.9 - 50.0 fL 43.8 46.0   Platelet Count Latest Ref Range: 164 - 446 K/uL 235 182   MPV Latest Ref Range: 9.0 - 12.9 fL 10.9 10.9     DISPOSITION: Home.    DISCHARGE MEDICATIONS:   Linda Pichardo   Home Medication Instructions PANKAJ:[id number]    Printed on:01/26/20 1307   Medication Information                      acetaminophen (TYLENOL) 325 MG Tab  Take 2 Tabs by mouth every 6 hours.             docusate sodium 100 MG Cap  Take 100 mg by mouth 2 times a day.             ibuprofen (MOTRIN) 600 MG Tab  Take 1 Tab by mouth every 6 hours.             oxyCODONE immediate-release (ROXICODONE) 5 MG Tab  Take 1 Tab by mouth every 6 hours as needed for up to 7 days.               DISCHARGE INSTRUCTIONS:  1. Pelvic rest and no heavy lifting greater than 10 pounds for 6 weeks post-operatively.   2. No driving for at least 2 weeks or longer while requiring pain medication.   3. Incision check in 2 weeks at [facility] [phone]  4. Post-partum vist in 6 weeks at [facility] [phone]  5. Return to the emergency department if experiencing increased vaginal bleeding, severe pain, temperature greater than 100.4, or any other concerns.    DISCHARGE CONDITION: Stable.    Cee Borden M.D.

## 2020-01-26 NOTE — LACTATION NOTE
This note was copied from a baby's chart.  Follow up per RN request. When LC arrived, mother had infant latched to right breast. Deep latch seen, and mother denies pain. Discussed possibility of clusterfeeding occurring tonight. No other questions at this time. Encouraged to call for support as needed.

## 2020-01-26 NOTE — PROGRESS NOTES
Obstetrics Postpartum Progress Note    Events  No events    Subjective  Pain: Yes,  controlled   Bleeding: lochia minimal  PO Intake: taking regular diet  Voiding: without difficulty  Ambulating: yes. No issues.  Feeding: breastfeeding well  Flatus: yes  Bowel Movement: no  Pre-Eclampsia Symptoms:   Headaches: none   Changes in vision: none    Physical Exam:  Vitals:  Vitals:    20 1000   BP: 130/82   Pulse: 85   Resp: 17   Temp: 36.3 °C (97.4 °F)   SpO2:    General: well and resting  Chest/Breasts: deferred   Cardiac: Regular rate  Lungs: CTABL  Abdomen:     Fundus: firm, below umbilicus and nontender   Incision: Steri strips in place, no surrounding errythema. Incision clean, dry and intact  Perineum: deferred  Extremities: no edema    Labs Reviewed and Significant for:  Results for LINDA PICHARDO (MRN [id number]) as of 2020 12:13   Ref. Range 2020 01:20 2020 05:33   WBC Latest Ref Range: 4.8 - 10.8 K/uL 16.3 (H) 20.7 (H)   RBC Latest Ref Range: 4.20 - 5.40 M/uL 4.68 4.39   Hemoglobin Latest Ref Range: 12.0 - 16.0 g/dL 14.2 13.1   Hematocrit Latest Ref Range: 37.0 - 47.0 % 41.3 39.8   MCV Latest Ref Range: 81.4 - 97.8 fL 88.2 90.7   MCH Latest Ref Range: 27.0 - 33.0 pg 30.3 29.8   MCHC Latest Ref Range: 33.6 - 35.0 g/dL 34.4 32.9 (L)   RDW Latest Ref Range: 35.9 - 50.0 fL 43.8 46.0   Platelet Count Latest Ref Range: 164 - 446 K/uL 235 182   MPV Latest Ref Range: 9.0 - 12.9 fL 10.9 10.9     Assessment and Plan:    Linda Pichardo is a 29 y.o.  postop day #2 2 s/p primary , Low Transverse  at 40w3d,  ml    ASSESSMENT:  1. Post-operative state    PLAN:  Continue routine postoperative care. Plan for discharge home tomorrow morning. Rx in chart. Encouraged ambulation today. Patient with post-operative visit in 2 weeks with Dr. Lam.     Cee Borden M.D.

## 2020-01-26 NOTE — PROGRESS NOTES
2010 Pt doing well bonding with baby, Assessment done wound dressing clean and dry, Pt denies pain at this time, Encourage more ambulation, Needs attended.

## 2020-01-26 NOTE — CARE PLAN
Problem: Alteration in comfort related to surgical incision and/or after birth pains  Goal: Patient is able to ambulate, care for self and infant with acceptable pain level  Note:   Patient is able to ambulate     Problem: Potential knowledge deficit related to lack of understanding of self and  care  Goal: Patient will demonstrate ability to care for self and infant  Note:   Patient demonstrates ability to care for self and infant.

## 2020-01-27 VITALS
HEIGHT: 65 IN | BODY MASS INDEX: 32.49 KG/M2 | TEMPERATURE: 97.9 F | RESPIRATION RATE: 16 BRPM | WEIGHT: 195 LBS | DIASTOLIC BLOOD PRESSURE: 69 MMHG | HEART RATE: 62 BPM | OXYGEN SATURATION: 95 % | SYSTOLIC BLOOD PRESSURE: 117 MMHG

## 2020-01-27 PROCEDURE — 700111 HCHG RX REV CODE 636 W/ 250 OVERRIDE (IP)

## 2020-01-27 PROCEDURE — A9270 NON-COVERED ITEM OR SERVICE: HCPCS | Performed by: OBSTETRICS & GYNECOLOGY

## 2020-01-27 PROCEDURE — 90707 MMR VACCINE SC: CPT

## 2020-01-27 PROCEDURE — 3E0234Z INTRODUCTION OF SERUM, TOXOID AND VACCINE INTO MUSCLE, PERCUTANEOUS APPROACH: ICD-10-PCS | Performed by: OBSTETRICS & GYNECOLOGY

## 2020-01-27 PROCEDURE — 700102 HCHG RX REV CODE 250 W/ 637 OVERRIDE(OP): Performed by: OBSTETRICS & GYNECOLOGY

## 2020-01-27 PROCEDURE — 90471 IMMUNIZATION ADMIN: CPT

## 2020-01-27 PROCEDURE — 700112 HCHG RX REV CODE 229: Performed by: OBSTETRICS & GYNECOLOGY

## 2020-01-27 RX ADMIN — OXYCODONE HYDROCHLORIDE 5 MG: 5 TABLET ORAL at 05:58

## 2020-01-27 RX ADMIN — VITAMIN A, VITAMIN C, VITAMIN D-3, VITAMIN E, VITAMIN B-1, VITAMIN B-2, NIACIN, VITAMIN B-6, CALCIUM, IRON, ZINC, COPPER 1 TABLET: 4000; 120; 400; 22; 1.84; 3; 20; 10; 1; 12; 200; 27; 25; 2 TABLET ORAL at 05:55

## 2020-01-27 RX ADMIN — IBUPROFEN 600 MG: 600 TABLET ORAL at 00:19

## 2020-01-27 RX ADMIN — IBUPROFEN 600 MG: 600 TABLET ORAL at 05:55

## 2020-01-27 RX ADMIN — DOCUSATE SODIUM 100 MG: 100 CAPSULE, LIQUID FILLED ORAL at 10:13

## 2020-01-27 RX ADMIN — OXYCODONE HYDROCHLORIDE 10 MG: 10 TABLET ORAL at 10:13

## 2020-01-27 RX ADMIN — MEASLES, MUMPS, AND RUBELLA VIRUS VACCINE LIVE 0.5 ML: 1000; 12500; 1000 INJECTION, POWDER, LYOPHILIZED, FOR SUSPENSION SUBCUTANEOUS at 10:13

## 2020-01-27 RX ADMIN — ACETAMINOPHEN 650 MG: 325 TABLET, FILM COATED ORAL at 05:55

## 2020-01-27 RX ADMIN — ACETAMINOPHEN 650 MG: 325 TABLET, FILM COATED ORAL at 00:19

## 2020-01-27 NOTE — PROGRESS NOTES
Discharged home. Patient is doing well. Escorted out via wheelchair by Aissatou AVINA. Baby is in the car seat.

## 2020-01-27 NOTE — PROGRESS NOTES
Report received from Celestina COREA @ 0640. Assumed care. Discussed plan of care especially the discharge instruction. All questions have that the patient have was answered.  Assessment done. Encouraged to call if with needs. Will check at intervals.

## 2020-01-27 NOTE — DISCHARGE INSTRUCTIONS
POSTPARTUM DISCHARGE INSTRUCTIONS FOR MOM    YOB: 1990   Age: 29 y.o.               Admit Date: 1/24/2020     Discharge Date: 1/27/2020  Attending Doctor:  Viviane Lam M.D.                  Allergies:  Levaquin    Discharged to home by car. Discharged via wheelchair, hospital escort: Yes.  Special equipment needed: Not Applicable  Belongings with: Personal  Be sure to schedule a follow-up appointment with your primary care doctor or any specialists as instructed.     Discharge Plan:   Diet Plan: Discussed  Activity Level: Discussed  Confirmed Follow up Appointment: Patient to Call and Schedule Appointment  Medication Reconciliation Updated: Yes  Influenza Vaccine Indication: Not indicated: Previously immunized this influenza season and > 8 years of age  Influenza Vaccine Given - only chart on this line when given: Influenza Vaccine Given (See MAR)    REASONS TO CALL YOUR OBSTETRICIAN:  1.   Persistent fever or shaking chills (Temperature higher than 100.4)  2.   Heavy bleeding (soaking more than 1 pad per hour); Passing clots  3.   Foul odor from vagina  4.   Mastitis (Breast infection; breast pain, chills, fever, redness)  5.   Urinary pain, burning or frequency  6.   Abdominal incision infection  7.   Severe depression longer than 24 hours    DISCHARGE INSTRUCTIONS:  1. Pelvic rest and no heavy lifting greater than 10 pounds for 6 weeks post-operatively.   2. No driving for at least 2 weeks or longer while requiring pain medication.   3. Inc[facility] check in [facility] weeks at [facility] [phone]  4. Post-partum vist in 6 weeks at [facility] [phone]  5. Return to the emergency department if experiencing increased vaginal bleeding, severe pain, temperature greater than 100.4, or any other concerns.       HAND WASHING  · Prior to handling the baby.  · Before breastfeeding or bottle feeding baby.  · After using the bathroom or changing the baby's diaper.    WOUND CARE  Ask your physician  "for additional care instructions.  In general:    ·  Incision:      · Keep clean and dry.    · Do NOT lift anything heavier than your baby for up to 6 weeks.    · There should not be any opening or pus.      VAGINAL CARE  · Nothing inside vagina for 6 weeks: no sexual intercourse, tampons or douching.  · Bleeding may continue for 2-4 weeks.  Amount may vary.    · Call your physician for heavy bleeding which means soaking more than 1 pad per hour    BIRTH CONTROL  · It is possible to become pregnant at any time after delivery and while breastfeeding.  · Plan to discuss a method of birth control with your physician at your follow up visit. visit.    DIET AND ELIMINATION  · Eating more fiber (bran cereal, fruits, and vegetables) and drinking plenty of fluids will help to avoid constipation.  · Urinary frequency after childbirth is normal.    POSTPARTUM BLUES  During the first few days after birth, you may experience a sense of the \"blues\" which may include impatience, irritability or even crying.  These feeling come and go quickly.  However, as many as 1 in 10 women experience emotional symptoms known as postpartum depression.    Postpartum depression:  May start as early as the second or third day after delivery or take several weeks or months to develop.  Symptoms of \"blues\" are present, but are more intense:  Crying spells; loss of appetite; feelings of hopelessness or loss of control; fear of touching the baby; over concern or no concern at all about the baby; little or no concern about your own appearance/caring for yourself; and/or inability to sleep or excessive sleeping.  Contact your physician if you are experiencing any of these symptoms.    Crisis Hotline:  · [place] Crisis Hotline:  6-371-TNSHPJO  Or 1-293.531.3573  · [place] Crisis Hotline:  1-246.164.4549  Or 086-145-7510    PREVENTING SHAKEN BABY:  If you are angry or stressed, PUT THE BABY IN THE CRIB, step away, take some deep breaths, and " "wait until you are calm to care for the baby.  DO NOT SHAKE THE BABY.  You are not alone, call a supporter for help.    · Crisis Call Center 24/7 crisis line 899-608-9860 or 1-958.733.1967  · You can also text them, text \"ANSWER\" to 327718    QUIT SMOKING/TOBACCO USE:  I understand the use of any tobacco products increases my chance of suffering from future heart disease and could cause other illnesses which may shorten my life. Quitting the use of tobacco products is the single most important thing I can do to improve my health. For further information on smoking / tobacco cessation call a Toll Free Quit Line at 1-630.863.3503 (*National Cancer [place]) or 1-235.884.7124 (American Lung Association) or you can access the web based program at www.lungusa.org.    · [place] Tobacco Users Help Line:  (523) 289-9970       Toll Free: 1-593.498.6661  · Quit Tobacco Program [facility] (684)780-9148    DEPRESSION / SUICIDE RISK:  As you are discharged from this [facility], it is important to learn how to keep safe from harming yourself.    Recognize the warning signs:  · Abrupt changes in personality, positive or negative- including increase in energy   · Giving away possessions  · Change in eating patterns- significant weight changes-  positive or negative  · Change in sleeping patterns- unable to sleep or sleeping all the time   · Unwillingness or inability to communicate  · Depression  · Unusual sadness, discouragement and loneliness  · Talk of wanting to die  · Neglect of personal appearance   · Rebelliousness- reckless behavior  · Withdrawal from people/activities they love  · Confusion- inability to concentrate     If you or a loved one observes any of these behaviors or has concerns about self-harm, here's what you can do:  · Talk about it- your feelings and reasons for harming yourself  · Remove any means that you might use to hurt yourself (examples: pills, rope, extension cords, " firearm)  · Get professional help from the community (Mental Health, Substance Abuse, psychological counseling)  · Do not be alone:Call your Safe Contact- someone whom you trust who will be there for you.  · Call your local CRISIS HOTLINE 644-1977 or 571-356-3443  · Call your local Children's Mobile Crisis Response Team [place] (849) 768-9905 or www.RiskIQ  · Call the toll free National Suicide Prevention Hotlines   · National Suicide Prevention Lifeline 655-391-LYEO (1016)  · [place] Hope Line Network 800-SUICIDE (035-6652)    DISCHARGE SURVEY:  Thank you for choosing [facility].  We hope we provided you with very good care.  You may be receiving a survey in the mail.  Please fill it out.  Your opinion is valuable to us.    ADDITIONAL EDUCATIONAL MATERIALS GIVEN TO PATIENT:        My signature on this form indicates that:  1.  I have reviewed and understand the above information  2.  My questions regarding this information have been answered to my satisfaction.  3.  I have formulated a plan with my discharge nurse to obtain my prescribed medication for home.

## 2020-01-27 NOTE — DISCHARGE PLANNING
Discharge Planning Assessment Post Partum     Reason for Referral: History of THC  Address: [address] [place] [address] [place] [address]  Phone: [phone]  Type of Living Situation: living with FOB  Mom Diagnosis: Pregnancy  Baby Diagnosis:   Primary Language: English     Father of the Baby: Bharath Domingos  Involved in baby’s care? Yes  Contact Information: [phone]     Prenatal Care: Yes  Mom's PCP: None  PCP for new baby: Dr. Miller     Support System: FOB, MOB's sister who is a pediatrician  Coping/Bonding between mother & baby: Yes  Source of Feeding: breast feeding  Supplies for Infant: prepared for infant     Mom's Insurance: [employer]  Baby Covered on Insurance: Yes  Mother Employed/School:  at [employer]  Other children in the home/names & ages: none, 1st baby     Financial Hardship/Income: denies   Mom's Mental status: alert and oriented  Services used prior to admit: none     CPS History: No  Psychiatric History: No  Domestic Violence History: No  Drug/ETOH History: history of marijuana use.  Infant's UDS is negative.  Per RN, a meconium has been sent out.  Waiting on the results of meconium drug screen     Resources Provided: Offered resources but MOB declined and stated she would talk to her sister who is a pediatrician if she needs anything  Referrals Made: none      Clearance for Discharge: Infant is cleared to discharge home with parents.  SW will follow-up on meconium drug screen and if positive a report will be made to [facility].

## 2020-01-27 NOTE — PROGRESS NOTES
Bedside report done, patient in bed with FOB at bedside. Denies pain at this time. will continue to monitor.

## 2020-01-27 NOTE — LACTATION NOTE
Attempted to see mother, already discharged home. Primary RN reports mother has pump for home and was doing well with feeding, has been pumping and feeding back milk when not latching. Primary RN reports no feeding concerns and felt mother ready to discharge home.

## 2020-01-27 NOTE — CARE PLAN
Problem: Altered physiologic condition related to postoperative  delivery  Goal: Patient physiologically stable as evidenced by normal lochia, palpable uterine involution and vital signs within normal limits  Outcome: PROGRESSING AS EXPECTED  Note:   Fundus firm at U, lochia rubra minimal, surgical incision with steri-strips intact. V/S WNL     Problem: Potential for postpartum infection related to surgical incision, compromised uterine condition, urinary tract or respiratory compromise  Goal: Patient will be afebrile and free from signs and symptoms of infection  Outcome: PROGRESSING AS EXPECTED  Note:   Patient has no signs of infection noted at this time.

## 2020-01-30 NOTE — DISCHARGE PLANNING
:    Received the results of infant's meconium drug screen which was negative.    No further needs.

## 2020-07-15 NOTE — CARE PLAN
Problem: Alteration in comfort related to surgical incision and/or after birth pains  Goal: Patient is able to ambulate, care for self and infant with acceptable pain level  Intervention: Administer pain meds as requested by patient and ordered by MD/DO/CNM  Note:   Pain medicine given as requested      declines

## 2020-10-05 ENCOUNTER — OFFICE VISIT (OUTPATIENT)
Dept: URGENT CARE | Facility: CLINIC | Age: 30
End: 2020-10-05
Payer: COMMERCIAL

## 2020-10-05 VITALS
HEIGHT: 65 IN | HEART RATE: 70 BPM | BODY MASS INDEX: 27.39 KG/M2 | RESPIRATION RATE: 14 BRPM | DIASTOLIC BLOOD PRESSURE: 74 MMHG | WEIGHT: 164.4 LBS | SYSTOLIC BLOOD PRESSURE: 122 MMHG | TEMPERATURE: 97.5 F | OXYGEN SATURATION: 98 %

## 2020-10-05 DIAGNOSIS — R10.2 SUPRAPUBIC PAIN, ACUTE: ICD-10-CM

## 2020-10-05 LAB
APPEARANCE UR: CLEAR
BILIRUB UR STRIP-MCNC: NEGATIVE MG/DL
COLOR UR AUTO: NORMAL
GLUCOSE UR STRIP.AUTO-MCNC: NEGATIVE MG/DL
KETONES UR STRIP.AUTO-MCNC: NEGATIVE MG/DL
LEUKOCYTE ESTERASE UR QL STRIP.AUTO: NEGATIVE
NITRITE UR QL STRIP.AUTO: NEGATIVE
PH UR STRIP.AUTO: 6 [PH] (ref 5–8)
PROT UR QL STRIP: NEGATIVE MG/DL
RBC UR QL AUTO: NEGATIVE
SP GR UR STRIP.AUTO: 1.03
UROBILINOGEN UR STRIP-MCNC: 0.2 MG/DL

## 2020-10-05 PROCEDURE — 99203 OFFICE O/P NEW LOW 30 MIN: CPT | Performed by: FAMILY MEDICINE

## 2020-10-05 PROCEDURE — 81002 URINALYSIS NONAUTO W/O SCOPE: CPT | Performed by: FAMILY MEDICINE

## 2020-10-05 ASSESSMENT — ENCOUNTER SYMPTOMS
DIARRHEA: 0
FEVER: 0
SORE THROAT: 0
HEADACHES: 0
VOMITING: 0
SHORTNESS OF BREATH: 0
NAUSEA: 0
ABDOMINAL PAIN: 1
CONSTIPATION: 1
COUGH: 0
HEARTBURN: 0

## 2020-10-05 NOTE — PROGRESS NOTES
Subjective:     Linda Miller is a 30 y.o. female who presents for GI Problem (x5 days, stomach pains, upper abd pain, bloating )    HPI  Pt presents for evaluation of a new problem  Pt with new abd pain and cramping the past 5 days   Feeling bloated   Pain is slightly better after eating   Pain is worse first thing in the morning   No nausea or vomiting   No diarrhea   Feeling somewhat constipated  No blood in stool    Review of Systems   Constitutional: Negative for fever.   HENT: Negative for congestion and sore throat.    Respiratory: Negative for cough and shortness of breath.    Cardiovascular: Negative for chest pain.   Gastrointestinal: Positive for abdominal pain and constipation. Negative for diarrhea, heartburn, nausea and vomiting.   Skin: Negative for rash.   Neurological: Negative for headaches.     PMH:  has a past medical history of Anesthesia (01/15/2019) and Bowel habit changes (01/15/2019).  MEDS:   Current Outpatient Medications:   •  acetaminophen (TYLENOL) 325 MG Tab, Take 2 Tabs by mouth every 6 hours. (Patient not taking: Reported on 10/5/2020), Disp: 28 Tab, Rfl: 0  •  docusate sodium 100 MG Cap, Take 100 mg by mouth 2 times a day. (Patient not taking: Reported on 10/5/2020), Disp: 60 Cap, Rfl: 0  •  ibuprofen (MOTRIN) 600 MG Tab, Take 1 Tab by mouth every 6 hours. (Patient not taking: Reported on 10/5/2020), Disp: 28 Tab, Rfl: 0  ALLERGIES:   Allergies   Allergen Reactions   • Levaquin Hives     SURGHX:   Past Surgical History:   Procedure Laterality Date   • PB  DELIVERY ONLY  2020    Procedure:  SECTION, PRIMARY;  Surgeon: Tony Lindquist M.D.;  Location: LABOR AND DELIVERY;  Service: Labor and Delivery   • ACL RECONSTRUCTION SCOPE Left 2019    Procedure: ACL RECONSTRUCTION SCOPE - W/BONE TENDON BONE AUTOGRAFT;  Surgeon: Antwon Garrido M.D.;  Location: SURGERY [facility];  Service: Orthopedics   • MENISCECTOMY Left 2019    Procedure:  "MENISCECTOMY - PARTIAL LATERAL;  Surgeon: Antwon Garrido M.D.;  Location: SURGERY [facility];  Service: Orthopedics   • COLONOSCOPY  2015   • TONSILLECTOMY  2013   • DENTAL EXTRACTION(S)  2011    wisdom teeth extraction   • ADENOIDECTOMY      1995     SOCHX:  reports that she has never smoked. She has never used smokeless tobacco. She reports current alcohol use. She reports current drug use.  FH: Family history was reviewed, not contributing to acute illness     Objective:   /74   Pulse 70   Temp 36.4 °C (97.5 °F) (Temporal)   Resp 14   Ht 1.651 m (5' 5\")   Wt 74.6 kg (164 lb 6.4 oz)   SpO2 98%   BMI 27.36 kg/m²     Physical Exam  Constitutional:       General: She is not in acute distress.     Appearance: She is well-developed. She is not diaphoretic.   HENT:      Head: Normocephalic and atraumatic.   Abdominal:      General: Abdomen is flat. Bowel sounds are normal. There is no distension.      Palpations: Abdomen is soft.      Tenderness: There is no right CVA tenderness, left CVA tenderness, guarding or rebound.      Comments: +TTP in suprapubic region    Skin:     General: Skin is warm and dry.      Findings: No erythema.   Neurological:      Mental Status: She is alert and oriented to person, place, and time.   Psychiatric:         Mood and Affect: Mood normal.         Behavior: Behavior normal.         Thought Content: Thought content normal.         Judgment: Judgment normal.       Assessment/Plan:   Assessment    1. Suprapubic pain, acute  - POCT Urinalysis    Patient with suprapubic pain for the past 5 days.  Her pain is overall mild, however not resolving.  On her exam, tender mainly in the suprapubic region.  Point-of-care urine within normal limits.  Reviewed possible etiologies including gynecologic disorders, constipation, appendicitis, gastroenteritis.  Her exam and history are most consistent with constipation as the etiology of her pain.  Reviewed risks and benefits of " further work-up versus empiric treatment.  Patient prefers to try empirically treating for a few days as her pain is not that bad.  If pain worsening or not resolving with MiraLAX, will return for follow-up and consideration of abdominal imaging.

## 2021-03-29 ENCOUNTER — NURSE TRIAGE (OUTPATIENT)
Dept: HEALTH INFORMATION MANAGEMENT | Facility: OTHER | Age: 31
End: 2021-03-29

## 2021-03-29 NOTE — TELEPHONE ENCOUNTER
Pt calling in for guidance on covid testing and isolation. Is fully vaccinated but son is sick with croup. Seeing pediatrician today and may get tested for covid. Pt started with mild sx yesterday and today. Discussed isolation and cdc recommendations. Discussed home care and answered all questions.     Reason for Disposition  • [1] COVID-19 infection diagnosed or suspected AND [2] mild symptoms (fever, cough) AND [3] no trouble breathing or other complications    Additional Information  • Negative: SEVERE difficulty breathing (e.g., struggling for each breath, speaks in single words)  • Negative: Difficult to awaken or acting confused (e.g., disoriented, slurred speech)  • Negative: Bluish (or gray) lips or face now  • Negative: Shock suspected (e.g., cold/pale/clammy skin, too weak to stand, low BP, rapid pulse)  • Negative: Sounds like a life-threatening emergency to the triager  • Negative: [1] COVID-19 suspected (e.g., cough, fever, shortness of breath) AND [2] public health department recommends testing  • Negative: [1] COVID-19 exposure AND [2] no symptoms  • Negative: COVID-19 and Breastfeeding, questions about  • Negative: SEVERE or constant chest pain (Exception: mild central chest pain, present only when coughing)  • Negative: MODERATE difficulty breathing (e.g., speaks in phrases, SOB even at rest, pulse 100-120)  • Negative: MILD difficulty breathing (e.g., minimal/no SOB at rest, SOB with walking, pulse <100)  • Negative: Chest pain  • Negative: Patient sounds very sick or weak to the triager  • Negative: Fever > 103 F (39.4 C)  • Negative: [1] Fever > 101 F (38.3 C) AND [2] age > 60  • Negative: [1] Fever > 100.0 F (37.8 C) AND [2] bedridden (e.g., nursing home patient, CVA, chronic illness, recovering from surgery)  • Negative: HIGH RISK patient (e.g., age > 64 years, diabetes, heart or lung disease, weak immune system)  • Negative: Fever present > 3 days (72 hours)  • Negative: [1] Fever returns  "after gone for over 24 hours AND [2] symptoms worse or not improved  • Negative: [1] Continuous (nonstop) coughing interferes with work or school AND [2] no improvement using cough treatment per protocol  • Negative: Cough present > 3 weeks    Answer Assessment - Initial Assessment Questions  1. COVID-19 DIAGNOSIS: \"Who made your Coronavirus (COVID-19) diagnosis?\" \"Was it confirmed by a positive lab test?\" If not diagnosed by a HCP, ask \"Are there lots of cases (community spread) where you live?\" (See public health department website, if unsure)    * MAJOR community spread: high number of cases; numbers of cases are increasing; many people hospitalized.    * MINOR community spread: low number of cases; not increasing; few or no people hospitalized      Major, exposed to son  2. ONSET: \"When did the COVID-19 symptoms start?\"       Last night  3. WORST SYMPTOM: \"What is your worst symptom?\" (e.g., cough, fever, shortness of breath, muscle aches)      Sore throat, body aches, cough,   4. COUGH: \"How bad is the cough?\"        mild  5. FEVER: \"Do you have a fever?\" If so, ask: \"What is your temperature, how was it measured, and when did it start?\"      no  6. RESPIRATORY STATUS: \"Describe your breathing?\" (e.g., shortness of breath, wheezing, unable to speak)       no  7. BETTER-SAME-WORSE: \"Are you getting better, staying the same or getting worse compared to yesterday?\"  If getting worse, ask, \"In what way?\"      worse  8. HIGH RISK DISEASE: \"Do you have any chronic medical problems?\" (e.g., asthma, heart or lung disease, weak immune system, etc.)      no  9. PREGNANCY: \"Is there any chance you are pregnant?\" \"When was your last menstrual period?\"      no  10. OTHER SYMPTOMS: \"Do you have any other symptoms?\"  (e.g., runny nose, headache, sore throat, loss of smell)        no    Protocols used: CORONAVIRUS (COVID-19) DIAGNOSED OR [id number]    "

## 2021-03-29 NOTE — TELEPHONE ENCOUNTER
----- Message from Jesenia Alfredo sent at 3/29/2021 10:47 AM PDT -----  Patient is having active symptoms sore throat and body pains.

## 2021-03-31 ENCOUNTER — OFFICE VISIT (OUTPATIENT)
Dept: URGENT CARE | Facility: CLINIC | Age: 31
End: 2021-03-31
Payer: COMMERCIAL

## 2021-03-31 VITALS
SYSTOLIC BLOOD PRESSURE: 122 MMHG | RESPIRATION RATE: 16 BRPM | HEART RATE: 69 BPM | DIASTOLIC BLOOD PRESSURE: 78 MMHG | HEIGHT: 65 IN | OXYGEN SATURATION: 98 % | TEMPERATURE: 98 F | WEIGHT: 149 LBS | BODY MASS INDEX: 24.83 KG/M2

## 2021-03-31 DIAGNOSIS — J06.9 UPPER RESPIRATORY TRACT INFECTION, UNSPECIFIED TYPE: ICD-10-CM

## 2021-03-31 PROCEDURE — 99213 OFFICE O/P EST LOW 20 MIN: CPT | Performed by: PHYSICIAN ASSISTANT

## 2021-03-31 ASSESSMENT — ENCOUNTER SYMPTOMS
SORE THROAT: 1
HEMOPTYSIS: 0
FEVER: 0
COUGH: 1
RHINORRHEA: 1
WHEEZING: 0
PALPITATIONS: 0
SHORTNESS OF BREATH: 0
CHILLS: 0

## 2021-04-01 NOTE — PROGRESS NOTES
Subjective:   Linda Miller is a 30 y.o. female who presents for Earache (x 5 days), Sore Throat, and Nasal Congestion      Otalgia   There is pain in both ears. This is a new problem. The current episode started today. Associated symptoms include coughing, rhinorrhea and a sore throat. She has tried nothing for the symptoms.       Review of Systems   Constitutional: Positive for malaise/fatigue. Negative for chills and fever.   HENT: Positive for congestion, ear pain, rhinorrhea and sore throat.    Respiratory: Positive for cough. Negative for hemoptysis, shortness of breath and wheezing.    Cardiovascular: Negative for chest pain and palpitations.   All other systems reviewed and are negative.      Medications:    • This patient does not have an active medication from one of the medication groupers.    Allergies: Levaquin    Problem List: Linda Miller does not have a problem list on file.    Surgical History:  Past Surgical History:   Procedure Laterality Date   • PB  DELIVERY ONLY  2020    Procedure:  SECTION, PRIMARY;  Surgeon: Tony Lindquist M.D.;  Location: LABOR AND DELIVERY;  Service: Labor and Delivery   • ACL RECONSTRUCTION SCOPE Left 2019    Procedure: ACL RECONSTRUCTION SCOPE - W/BONE TENDON BONE AUTOGRAFT;  Surgeon: Antwon Garrido M.D.;  Location: [facility];  Service: Orthopedics   • MENISCECTOMY Left 2019    Procedure: MENISCECTOMY - PARTIAL LATERAL;  Surgeon: Antwon Garrido M.D.;  Location: SURGERY [facility];  Service: Orthopedics   • COLONOSCOPY     • TONSILLECTOMY     • DENTAL EXTRACTION(S)      wisdom teeth extraction   • ADENOIDECTOMY             Past Social Hx: Linda Miller  reports that she has never smoked. She has never used smokeless tobacco. She reports current alcohol use. She reports current drug use. Drug: Marijuana.     Past Family Hx:  Linda Miller family history is not on  "file.     Problem list, medications, and allergies reviewed by myself today in Epic.     Objective:     Blood Pressure 122/78   Pulse 69   Temperature 36.7 °C (98 °F) (Temporal)   Respiration 16   Height 1.651 m (5' 5\")   Weight 67.6 kg (149 lb)   Oxygen Saturation 98%   Body Mass Index 24.79 kg/m²     Physical Exam  Vitals reviewed.   Constitutional:       General: She is not in acute distress.     Appearance: She is well-developed. She is not ill-appearing or toxic-appearing.      Interventions: She is not intubated.  HENT:      Head: Normocephalic and atraumatic.      Right Ear: Hearing, ear canal and external ear normal. A middle ear effusion is present.      Left Ear: Hearing, ear canal and external ear normal. A middle ear effusion is present.      Nose: Nose normal.      Mouth/Throat:      Pharynx: Uvula midline.   Eyes:      General: Lids are normal.      Conjunctiva/sclera: Conjunctivae normal.   Cardiovascular:      Rate and Rhythm: Regular rhythm.      Heart sounds: Normal heart sounds, S1 normal and S2 normal. No murmur. No friction rub. No gallop.    Pulmonary:      Effort: Pulmonary effort is normal. No tachypnea, bradypnea, accessory muscle usage or respiratory distress. She is not intubated.      Breath sounds: Normal breath sounds. No decreased breath sounds, wheezing, rhonchi or rales.   Chest:      Chest wall: No tenderness.   Musculoskeletal:         General: Normal range of motion.      Cervical back: Full passive range of motion without pain, normal range of motion and neck supple.   Skin:     General: Skin is warm and dry.   Neurological:      Mental Status: She is alert and oriented to person, place, and time.   Psychiatric:         Speech: Speech normal.         Behavior: Behavior normal.         Thought Content: Thought content normal.         Judgment: Judgment normal.         Assessment/Plan:     Medical Decision Making/Comments     Pt is a  30 yr old female who presents for " evaluation of URI symptoms.  Pt states ear pain, cough and congestion x 3 days.  Pt denies fever or SOB.  Vital signs normal.  ENT exam is unremarkable.  Lungs are clear to auscultation.   Heart sounds are normal.  Pt is most likely experiencing an upper respiratory infection.  Discussed at length that 90% of URI's are viral in etiology and only supportive care is indicated.      Diagnosis and associated orders     1. Upper respiratory tract infection, unspecified type                Differential diagnosis, natural history, supportive care, and indications for immediate follow-up discussed.    Advised the patient to follow-up with the primary care physician for recheck, reevaluation, and consideration of further management.    Please note that this dictation was created using voice recognition software. I have made a reasonable attempt to correct obvious errors, but I expect that there are errors of grammar and possibly content that I did not discover before finalizing the note.

## 2022-05-20 ENCOUNTER — OFFICE VISIT (OUTPATIENT)
Dept: URGENT CARE | Facility: CLINIC | Age: 32
End: 2022-05-20
Payer: COMMERCIAL

## 2022-05-20 VITALS
WEIGHT: 140 LBS | HEART RATE: 76 BPM | HEIGHT: 65 IN | TEMPERATURE: 98.2 F | BODY MASS INDEX: 23.32 KG/M2 | OXYGEN SATURATION: 98 % | RESPIRATION RATE: 16 BRPM | DIASTOLIC BLOOD PRESSURE: 80 MMHG | SYSTOLIC BLOOD PRESSURE: 118 MMHG

## 2022-05-20 DIAGNOSIS — J02.9 SORE THROAT: ICD-10-CM

## 2022-05-20 DIAGNOSIS — R09.82 POSTNASAL DRIP: ICD-10-CM

## 2022-05-20 LAB
INT CON NEG: NORMAL
INT CON POS: NORMAL
S PYO AG THROAT QL: NEGATIVE

## 2022-05-20 PROCEDURE — 99213 OFFICE O/P EST LOW 20 MIN: CPT | Performed by: PHYSICIAN ASSISTANT

## 2022-05-20 PROCEDURE — 87880 STREP A ASSAY W/OPTIC: CPT | Performed by: PHYSICIAN ASSISTANT

## 2022-05-20 RX ORDER — IBUPROFEN 200 MG
200 TABLET ORAL EVERY 6 HOURS PRN
COMMUNITY
End: 2023-04-30

## 2022-05-20 RX ORDER — FLUTICASONE PROPIONATE 50 MCG
1 SPRAY, SUSPENSION (ML) NASAL DAILY
Qty: 16 G | Refills: 0 | Status: SHIPPED
Start: 2022-05-20 | End: 2023-04-30

## 2022-05-20 ASSESSMENT — ENCOUNTER SYMPTOMS
SINUS PAIN: 1
HEADACHES: 1

## 2022-05-20 NOTE — PROGRESS NOTES
"  Subjective:   Linda Miller is a 31 y.o. female who presents today with   Chief Complaint   Patient presents with   • Sore Throat     X10 days, \"tickle in throat,\" headache x1 day   • Sinus Problem     Pharyngitis   This is a new problem. Episode onset: 10 days. The problem has been unchanged. There has been no fever. Associated symptoms include congestion and headaches. She has tried nothing for the symptoms. The treatment provided no relief.   Patient had some headache and sinus pressure starting today.  Has had what feels like a tickle in her throat for about 10 days.    PMH:  has a past medical history of Anesthesia (01/15/2019) and Bowel habit changes (01/15/2019).  MEDS:   Current Outpatient Medications:   •  ibuprofen (MOTRIN) 200 MG Tab, Take 200 mg by mouth every 6 hours as needed., Disp: , Rfl:   •  fluticasone (FLONASE) 50 MCG/ACT nasal spray, Administer 1 Spray into affected nostril(S) every day., Disp: 16 g, Rfl: 0  ALLERGIES:   Allergies   Allergen Reactions   • Levaquin Hives     SURGHX:   Past Surgical History:   Procedure Laterality Date   • AK  DELIVERY ONLY  2020    Procedure:  SECTION, PRIMARY;  Surgeon: Tony Lindquist M.D.;  Location: LABOR AND DELIVERY;  Service: Labor and Delivery   • RECONSTRUCTION, KNEE, ACL, ARTHROSCOPIC Left 2019    Procedure: ACL RECONSTRUCTION SCOPE - W/BONE TENDON BONE AUTOGRAFT;  Surgeon: Antwon Garrido M.D.;  Location: SURGERY [facility];  Service: Orthopedics   • MENISCECTOMY Left 2019    Procedure: MENISCECTOMY - PARTIAL LATERAL;  Surgeon: Antwon Garrido M.D.;  Location: SURGERY [facility];  Service: Orthopedics   • COLONOSCOPY     • TONSILLECTOMY     • DENTAL EXTRACTION(S)      wisdom teeth extraction   • ADENOIDECTOMY         • PRIMARY C SECTION       SOCHX:  reports that she has never smoked. She has never used smokeless tobacco. She reports current alcohol use. She reports " "current drug use. Drug: Marijuana.  FH: Reviewed with patient, not pertinent to this visit.     Review of Systems   HENT: Positive for congestion and sinus pain.    Neurological: Positive for headaches.      Objective:   /80   Pulse 76   Temp 36.8 °C (98.2 °F) (Temporal)   Resp 16   Ht 1.651 m (5' 5\")   Wt 63.5 kg (140 lb)   LMP 05/13/2022 (Exact Date)   SpO2 98%   Breastfeeding No   BMI 23.30 kg/m²   Physical Exam  Vitals and nursing note reviewed.   Constitutional:       General: She is not in acute distress.     Appearance: Normal appearance. She is well-developed. She is not ill-appearing or toxic-appearing.   HENT:      Head: Normocephalic and atraumatic.      Right Ear: Hearing normal.      Left Ear: Hearing normal.      Nose: Congestion present.      Mouth/Throat:      Pharynx: Posterior oropharyngeal erythema present. No oropharyngeal exudate.      Comments: Postnasal drip  Cardiovascular:      Rate and Rhythm: Normal rate and regular rhythm.      Heart sounds: Normal heart sounds.   Pulmonary:      Effort: Pulmonary effort is normal.      Breath sounds: Normal breath sounds. No stridor. No wheezing, rhonchi or rales.   Musculoskeletal:      Comments: Normal movement in all 4 extremities   Skin:     General: Skin is warm and dry.   Neurological:      Mental Status: She is alert.      Coordination: Coordination normal.   Psychiatric:         Mood and Affect: Mood normal.       STREP A Negative     Assessment/Plan:   Assessment    1. Sore throat  - POCT Rapid Strep A    2. Postnasal drip  - fluticasone (FLONASE) 50 MCG/ACT nasal spray; Administer 1 Spray into affected nostril(S) every day.  Dispense: 16 g; Refill: 0    Other orders  - ibuprofen (MOTRIN) 200 MG Tab; Take 200 mg by mouth every 6 hours as needed.  Symptoms and presentation at this time are consistent with postnasal drip and no indication for antibiotics.  Recommend use of Flonase and decongestant/antihistamine of choice " over-the-counter.  Differential diagnosis, natural history, supportive care, and indications for immediate follow-up discussed.   Patient given instructions and understanding of medications and treatment.    If not improving in 3-5 days, F/U with PCP or return to UC if symptoms worsen.    Patient agreeable to plan.       Please note that this dictation was created using voice recognition software. I have made every reasonable attempt to correct obvious errors, but I expect that there are errors of grammar and possibly content that I did not discover before finalizing the note.    Kurt Durant PA-C

## 2023-04-30 ENCOUNTER — OFFICE VISIT (OUTPATIENT)
Dept: URGENT CARE | Facility: CLINIC | Age: 33
End: 2023-04-30
Payer: COMMERCIAL

## 2023-04-30 VITALS
DIASTOLIC BLOOD PRESSURE: 64 MMHG | HEIGHT: 65 IN | HEART RATE: 108 BPM | RESPIRATION RATE: 16 BRPM | BODY MASS INDEX: 24.83 KG/M2 | TEMPERATURE: 101.2 F | OXYGEN SATURATION: 98 % | SYSTOLIC BLOOD PRESSURE: 124 MMHG | WEIGHT: 149 LBS

## 2023-04-30 DIAGNOSIS — R00.0 TACHYCARDIA: ICD-10-CM

## 2023-04-30 DIAGNOSIS — R50.9 FEVER, UNSPECIFIED FEVER CAUSE: ICD-10-CM

## 2023-04-30 DIAGNOSIS — J02.9 SORE THROAT: ICD-10-CM

## 2023-04-30 LAB
INT CON NEG: NORMAL
INT CON POS: NORMAL
S PYO AG THROAT QL: NEGATIVE

## 2023-04-30 PROCEDURE — 87880 STREP A ASSAY W/OPTIC: CPT | Performed by: FAMILY MEDICINE

## 2023-04-30 PROCEDURE — 99214 OFFICE O/P EST MOD 30 MIN: CPT | Performed by: FAMILY MEDICINE

## 2023-04-30 RX ORDER — DEXTROAMPHETAMINE SACCHARATE, AMPHETAMINE ASPARTATE, DEXTROAMPHETAMINE SULFATE AND AMPHETAMINE SULFATE 5; 5; 5; 5 MG/1; MG/1; MG/1; MG/1
TABLET ORAL
COMMUNITY
Start: 2023-03-01

## 2023-04-30 NOTE — PROGRESS NOTES
"  Subjective:      32 y.o. female presents to urgent care for cold symptoms that started a couple of days ago.  She is experiencing sore throat, fever, and body aches.  No cough, headache, or diarrhea.  Heart rate is elevated today in urgent care.  She denies any chest pain, palpitations, or shortness of breath.  No tobacco product use.  No history of asthma or COPD.  She is fully vaccinated with COVID.  No known sick contacts.    She denies any other questions or concerns at this time.    Current problem list, medication, and past medical/surgical history were reviewed in Epic.    ROS  See HPI     Objective:      /64 (BP Location: Right arm, Patient Position: Sitting, BP Cuff Size: Adult)   Pulse (!) 108   Temp (!) 38.4 °C (101.2 °F) (Temporal)   Resp 16   Ht 1.651 m (5' 5\")   Wt 67.6 kg (149 lb)   LMP 03/30/2023 (Approximate)   SpO2 98%   Breastfeeding No   BMI 24.79 kg/m²     Physical Exam  Constitutional:       General: She is not in acute distress.     Appearance: She is not diaphoretic.   HENT:      Right Ear: Tympanic membrane, ear canal and external ear normal.      Left Ear: Tympanic membrane, ear canal and external ear normal.      Mouth/Throat:      Tongue: Tongue does not deviate from midline.      Palate: No lesions.      Pharynx: Uvula midline. Posterior oropharyngeal erythema present.      Tonsils: No tonsillar exudate. 1+ on the right. 1+ on the left.   Cardiovascular:      Rate and Rhythm: Regular rhythm. Tachycardia present.      Heart sounds: Normal heart sounds.   Pulmonary:      Effort: Pulmonary effort is normal. No respiratory distress.      Breath sounds: Normal breath sounds.   Neurological:      Mental Status: She is alert.   Psychiatric:         Mood and Affect: Affect normal.         Judgment: Judgment normal.     Assessment/Plan:     1. Sore throat  2. Tachycardia  3. Fever, unspecified fever cause  Systemic symptoms seen through tachycardia and fever.  Rapid strep " negative.  Most consistent with virus.  Tylenol, ibuprofen, gargle warm salt water as needed for symptomatic relief.  - POCT Rapid Strep A      Instructed to return to Urgent Care or nearest Emergency Department if symptoms fail to improve, for any change in condition, further concerns, or new concerning symptoms. Patient states understanding of the plan of care and discharge instructions.    Yeny Lindsay M.D.

## 2024-04-02 ENCOUNTER — APPOINTMENT (OUTPATIENT)
Dept: URGENT CARE | Facility: CLINIC | Age: 34
End: 2024-04-02
Payer: COMMERCIAL

## (undated) DEVICE — SENSOR SPO2 NEO LNCS ADHESIVE (20/BX) SEE USER NOTES

## (undated) DEVICE — GLOVE BIOGEL INDICATOR SZ 7.5 SURGICAL PF LTX - (50PR/BX 4BX/CA)

## (undated) DEVICE — PEN SKIN MARKER W/RULER - (50EA/BX)

## (undated) DEVICE — SET EXTENSION WITH 2 PORTS (48EA/CA) ***PART #2C8610 IS A SUBSTITUTE*****

## (undated) DEVICE — DRAPE U ORTHOPEDIC - (10/BX)

## (undated) DEVICE — SUTURE 0 VICRYL PLUS CT-1 - 36 INCH (36/BX)

## (undated) DEVICE — STAPLER SKIN DISP - (6/BX 10BX/CA) VISISTAT

## (undated) DEVICE — GOWN SURGICAL X-LARGE ULTRA - FILM-REINFORCED (20/CA)

## (undated) DEVICE — SUTURE 3-0 ETHILON FS-1 - (36/BX) 30 INCH

## (undated) DEVICE — HEAD HOLDER JUNIOR/ADULT

## (undated) DEVICE — GLOVE BIOGEL SZ 7.5 SURGICAL PF LTX - (50PR/BX 4BX/CA)

## (undated) DEVICE — SHAVER4.0 AGGRESSIVE + FORMLA (5EA/BX)

## (undated) DEVICE — SODIUM CHL. IRRIGATION 0.9% 3000ML (4EA/CA 65CA/PF)

## (undated) DEVICE — TAPE CLOTH MEDIPORE 6 INCH - (12RL/CA)

## (undated) DEVICE — ELECTRODE DUAL RETURN W/ CORD - (50/PK)

## (undated) DEVICE — TUBE CONNECTING SUCTION - CLEAR PLASTIC STERILE 72 IN (50EA/CA)

## (undated) DEVICE — SUTURE GENERAL

## (undated) DEVICE — SLEEVE, SEQUENTIAL CALF REG

## (undated) DEVICE — KIT ANESTHESIA W/CIRCUIT & 3/LT BAG W/FILTER (20EA/CA)

## (undated) DEVICE — SUTURE 3-0 VICRYL PLUS CT-1 - 36 INCH (36/BX)

## (undated) DEVICE — PACK C-SECTION (2EA/CA)

## (undated) DEVICE — PACK KNEE ARTHROSCOPY SM OR - (2EA/CA)

## (undated) DEVICE — CHLORAPREP 26 ML APPLICATOR - ORANGE TINT(25/CA)

## (undated) DEVICE — CANISTER SUCTION 3000ML MECHANICAL FILTER AUTO SHUTOFF MEDI-VAC NONSTERILE LF DISP  (40EA/CA)

## (undated) DEVICE — GLOVE BIOGEL INDICATOR SZ 8 SURGICAL PF LTX - (50/BX 4BX/CA)

## (undated) DEVICE — CATHETER IV NON-SAFETY 18 GA X 1 1/4 (50/BX 4BX/CA)

## (undated) DEVICE — PACK MINOR BASIN - (2EA/CA)

## (undated) DEVICE — SODIUM CHL IRRIGATION 0.9% 1000ML (12EA/CA)

## (undated) DEVICE — PAD LAP STERILE 18 X 18 - (5/PK 40PK/CA)

## (undated) DEVICE — BLANKET UNDERBODY FULL ACCES - (5/CA)

## (undated) DEVICE — TUBING CLEARLINK DUO-VENT - C-FLO (48EA/CA)

## (undated) DEVICE — Device

## (undated) DEVICE — CATHETER FOLEY ROBINSON 10FR 16IN STRL (12EA/CA)

## (undated) DEVICE — TRAY BLADDER CARE W/ 16 FR FOLEY CATHETER STATLOCK  (10/CA)

## (undated) DEVICE — GOWN WARMING STANDARD FLEX - (30/CA)

## (undated) DEVICE — SUTURE 2-0 VICRYL PLUS CT-1 36 (36PK/BX)"

## (undated) DEVICE — ELECTRODE 850 FOAM ADHESIVE - HYDROGEL RADIOTRNSPRNT (50/PK)

## (undated) DEVICE — SUTURE 4-0 27IN VCRL PLUS ANTI (36PK/BX)

## (undated) DEVICE — ARTHROWAND TURBOVAC 3.5/90 SCT

## (undated) DEVICE — SUTURE 0 VICRYL PLUS CT 36 (36PK/BX)"

## (undated) DEVICE — TUBING PATIENT W/CONNECTOR REDEUCE (1EA)

## (undated) DEVICE — KNIFE ACL GRAFT 10MM (10EA/PK)

## (undated) DEVICE — DETERGENT RENUZYME PLUS 10 OZ PACKET (50/BX)

## (undated) DEVICE — LACTATED RINGERS INJ 1000 ML - (14EA/CA 60CA/PF)

## (undated) DEVICE — PADDING CAST 6 IN STERILE - 6 X 4 YDS (24/CA)

## (undated) DEVICE — KIT ROOM DECONTAMINATION

## (undated) DEVICE — DRAPE LARGE 3 QUARTER - (20/CA)

## (undated) DEVICE — MASK ANESTHESIA ADULT  - (100/CA)

## (undated) DEVICE — KIT  I.V. START (100EA/CA)

## (undated) DEVICE — DRESSING XEROFORM 1X8 - (50/BX 4BX/CA)

## (undated) DEVICE — WATER IRRIG. STER. 1500 ML - (9/CA)

## (undated) DEVICE — MASK AIRWAY SIZE 3 UNIQUE SILICON (10/BX)

## (undated) DEVICE — BLOCK

## (undated) DEVICE — DRESSING ABDOMINAL PAD STERILE 8 X 10" (360EA/CA)"

## (undated) DEVICE — NEPTUNE 4 PORT MANIFOLD - (20/PK)

## (undated) DEVICE — PROTECTOR ULNA NERVE - (36PR/CA)

## (undated) DEVICE — SHEATH RO 4F 10CM (10EA/BX)

## (undated) DEVICE — TRAY SPINAL ANESTHESIA NON-SAFETY (10/CA)

## (undated) DEVICE — DRL BIT4.5 STR ENDOSCPC CANN

## (undated) DEVICE — GLOVE, LITE (PAIR)

## (undated) DEVICE — BLADE OSCILLATING 9MMX24.6MMX0.4MM LIKE STRYKER 2296-3-111